# Patient Record
Sex: FEMALE | Race: WHITE | NOT HISPANIC OR LATINO | URBAN - NONMETROPOLITAN AREA
[De-identification: names, ages, dates, MRNs, and addresses within clinical notes are randomized per-mention and may not be internally consistent; named-entity substitution may affect disease eponyms.]

---

## 2017-02-22 ENCOUNTER — TELEPHONE (OUTPATIENT)
Dept: MEDICAL GROUP | Facility: PHYSICIAN GROUP | Age: 42
End: 2017-02-22

## 2017-02-22 NOTE — Clinical Note
February 22, 2017        Jennifer Lamb  [address]  [facility] [address]        Dear Jennifer:    This letter is to make sure that you received results of your mammogram in the fall. This was a normal result. 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    Gratefully this result is normal repeat in one year    If you have any questions or concerns, please don't hesitate to call.        Sincerely,        FRANSISCO Scott.    Electronically Signed

## 2018-10-29 ENCOUNTER — OFFICE VISIT (OUTPATIENT)
Dept: MEDICAL GROUP | Facility: PHYSICIAN GROUP | Age: 43
End: 2018-10-29
Payer: COMMERCIAL

## 2018-10-29 VITALS
BODY MASS INDEX: 33.05 KG/M2 | HEIGHT: 64 IN | DIASTOLIC BLOOD PRESSURE: 78 MMHG | HEART RATE: 70 BPM | TEMPERATURE: 98.8 F | OXYGEN SATURATION: 100 % | RESPIRATION RATE: 16 BRPM | WEIGHT: 193.6 LBS | SYSTOLIC BLOOD PRESSURE: 112 MMHG

## 2018-10-29 DIAGNOSIS — Z00.00 HEALTHCARE MAINTENANCE: ICD-10-CM

## 2018-10-29 DIAGNOSIS — G43.829 MENSTRUAL MIGRAINE WITHOUT STATUS MIGRAINOSUS, NOT INTRACTABLE: ICD-10-CM

## 2018-10-29 PROCEDURE — 99214 OFFICE O/P EST MOD 30 MIN: CPT | Performed by: NURSE PRACTITIONER

## 2018-10-29 RX ORDER — SUMATRIPTAN 100 MG/1
100 TABLET, FILM COATED ORAL
Qty: 10 TAB | Refills: 2 | Status: SHIPPED | OUTPATIENT
Start: 2018-10-29 | End: 2019-08-16 | Stop reason: SDUPTHER

## 2018-10-29 ASSESSMENT — PATIENT HEALTH QUESTIONNAIRE - PHQ9: CLINICAL INTERPRETATION OF PHQ2 SCORE: 0

## 2018-10-29 NOTE — ASSESSMENT & PLAN NOTE
Patient is a 43-year-old female who is here to establish care and discuss migraines.  She started suffering from migraines about 5 years ago when she moved to the state with her  for his work.  Generally she develops a headache which occasionally turns into a migraine during menstruation.  She has worked with her gynecologist to try various birth control methods to mitigate her migraines.  Presently on Tri-Sprintec which seems to be working well for her. She is not getting migraines every month.  Her last migraine was on 10/13/2018 and she was in the ER for blurred vision.  Generally her migraine starts with left eye pain and progresses to a throbbing unilateral pain.  Generally Excedrin Migraine aborts her headache, rarely this does not completely resolve the headache.  While in the ER she had a head CT which was reportedly normal.  Labs were reportedly normal.  She was treated with migraine cocktail and the migraine and blurred vision abruptly resolved.

## 2018-10-29 NOTE — LETTER
[facility]  JOSE LUIS Piña.  [address]  [place] [address]  Fax: [phone]   Authorization for Release/Disclosure of   Protected Health Information   Name: JENNIFER BROOKS : 1975 SSN: [id number]   Address: [address] [place] [id number] Phone:    [phone] (home)    I authorize the entity listed below to release/disclose the PHI below to:   [facility]/HALEY Piña and HALEY Piña   Provider or Entity Name:  [facility] - mammo + ER visit 10/13/18   Address   City, State, Zip   Phone:      Fax:     Reason for request: continuity of care   Information to be released:    [  ] LAST COLONOSCOPY,  including any PATH REPORT and follow-up  [  ] LAST FIT/COLOGUARD RESULT [  ] LAST DEXA  [ x ] LAST MAMMOGRAM  [  ] LAST PAP  [  ] LAST LABS [  ] RETINA EXAM REPORT  [  ] IMMUNIZATION RECORDS  [  ] Release all info      [  ] Check here and initial the line next to each item to release ALL health information INCLUDING  _____ Care and treatment for drug and / or alcohol abuse  _____ HIV testing, infection status, or AIDS  _____ Genetic Testing    DATES OF SERVICE OR TIME PERIOD TO BE DISCLOSED: _____________  I understand and acknowledge that:  * This Authorization may be revoked at any time by you in writing, except if your health information has already been used or disclosed.  * Your health information that will be used or disclosed as a result of you signing this authorization could be re-disclosed by the recipient. If this occurs, your re-disclosed health information may no longer be protected by State or Federal laws.  * You may refuse to sign this Authorization. Your refusal will not affect your ability to obtain treatment.  * This Authorization becomes effective upon signing and will  on (date) __________.      If no date is indicated, this Authorization will  one (1) year from the signature date.    Name: Jennifer Brooks    Signature:   Date:      10/29/2018       PLEASE FAX REQUESTED RECORDS BACK TO: [phone]

## 2018-10-29 NOTE — PROGRESS NOTES
[place] [facility]  Primary Care Office Visit - Problem-Oriented        History:     Jennifer Lamb is a 43 y.o. female who is here today to discuss Migraine (ER [facility]/ double vision/ migraine behind left eye/ referrals needed )      Menstrual migraine without status migrainosus, not intractable  Patient is a 43-year-old female who is here to establish care and discuss migraines.  She started suffering from migraines about 5 years ago when she moved to the [place] with her  for his work.  Generally she develops a headache which occasionally turns into a migraine during menstruation.  She has worked with her gynecologist to try various birth control methods to mitigate her migraines.  Presently on Tri-Sprintec which seems to be working well for her. She is not getting migraines every month.  Her last migraine was on 10/13/2018 and she was in the ER for blurred vision.  Generally her migraine starts with left eye pain and progresses to a throbbing unilateral pain.  Generally Excedrin Migraine aborts her headache, rarely this does not completely resolve the headache.  While in the ER she had a head CT which was reportedly normal.  Labs were reportedly normal.  She was treated with migraine cocktail and the migraine and blurred vision abruptly resolved.      Healthcare maintenance  PAP 2016, normal, GYN - Dr. Yuniel newsome within the year and normal, [facility]         Past Medical History:   Diagnosis Date   • Back pain      Past Surgical History:   Procedure Laterality Date   • ABDOMINAL EXPLORATION      galbladder     Social History     Social History   • Marital status:      Spouse name: N/A   • Number of children: N/A   • Years of education: N/A     Occupational History   • Not on file.     Social History Main Topics   • Smoking status: Never Smoker   • Smokeless tobacco: Never Used   • Alcohol use No   • Drug use: No   • Sexual activity: Yes     Partners: Male     Other Topics Concern   • Not on file  "    Social History Narrative   • No narrative on file     History   Smoking Status   • Never Smoker   Smokeless Tobacco   • Never Used     Family History   Problem Relation Age of Onset   • Diabetes Mother    • Heart Disease Mother    • Arthritis Mother    • Diabetes Father    • Heart Disease Father    • Heart Disease Brother      Allergies   Allergen Reactions   • Codeine      Nausea/ shakes        Problem List:     Patient Active Problem List    Diagnosis Date Noted   • Healthcare maintenance 10/29/2018   • Midline low back pain without sciatica 08/05/2015   • BMI 31.0-31.9,adult 08/05/2015   • Deafness in left ear 08/05/2015   • Menstrual migraine without status migrainosus, not intractable 08/05/2015         Medications:     Current Outpatient Prescriptions:   •  sumatriptan (IMITREX) 100 MG tablet, Take 1 Tab by mouth Once PRN for Migraine for up to 1 dose., Disp: 10 Tab, Rfl: 2  •  Norgestim-Eth Estrad Triphasic (TRI-SPRINTEC) 0.18/0.215/0.25 MG-35 MCG Tab, Take 1 Tab by mouth every day., Disp: , Rfl:   •  hydrocodone-acetaminophen (NORCO) 5-325 MG Tab per tablet, Take 1 Tab by mouth every 6 hours as needed. (Patient not taking: Reported on 10/29/2018), Disp: 30 Tab, Rfl: 0      Review of Systems:     Pertinent positives as per HPI, all other systems reviewed and WNL    Physical Assessment:     VS: /78 (BP Location: Left arm, Patient Position: Sitting, BP Cuff Size: Adult)   Pulse 70   Temp 37.1 °C (98.8 °F) (Temporal)   Resp 16   Ht 1.626 m (5' 4\")   Wt 87.8 kg (193 lb 9.6 oz)   SpO2 100%   BMI 33.23 kg/m²     General: Well-developed, well-nourished, female     Head: PERRL, EOMI. Normocephalic. No facial asymmetry noted.  Neck: Neck supple, no thyromegaly  Cardiovasc:  RRR, no MRG. No thrills or bruits. Pulses 2+ and symmetric at all distal extremities.  Pulmonary: Lungs clear bilaterally.  Normal respiratory effort. No wheeze or crackles.   Extremities: No edema, no TTP bilateral calves. Pedal " pulses intact. No joint effusions. LEs warm and well-perfused.  Neuro: Alert and oriented, CNs II-XII intact, no focal deficits.  Skin:No rashes noted. Skin warm, dry, intact    Psych: Dressed appropriately for the weather, pleasant and conversant.  Affect, mood & judgment appropriate.      Assessment/Plan:   Jennifer was seen today for migraine.    Diagnoses and all orders for this visit:    Menstrual migraine without status migrainosus, not intractable, stable   -We will attempt to obtain head CT report, recommend that she use Excedrin Migraine at the first onset of symptoms should migraine persist, recommend use of sumatriptan  mg.  Aware of the side effects of medication.  Patient should follow-up if migraines increase in frequency and severity which case we will discuss prophylactic treatments.  -     sumatriptan (IMITREX) 100 MG tablet; Take 1 Tab by mouth Once PRN for Migraine for up to 1 dose.    Healthcare maintenance  -Obtain mammo       Patient is agreeable to the above plan and voiced understanding. All questions answered.     Please note that this dictation was created using voice recognition software. I have made every reasonable attempt to correct obvious errors, but I expect that there are errors of grammar and possibly content that I did not discover before finalizing the note.      ZAID Hernandez  10/29/2018, 1:31 PM

## 2019-04-18 ENCOUNTER — OFFICE VISIT (OUTPATIENT)
Dept: MEDICAL GROUP | Facility: PHYSICIAN GROUP | Age: 44
End: 2019-04-18
Payer: COMMERCIAL

## 2019-04-18 VITALS
TEMPERATURE: 98.3 F | OXYGEN SATURATION: 100 % | SYSTOLIC BLOOD PRESSURE: 120 MMHG | HEIGHT: 64 IN | RESPIRATION RATE: 12 BRPM | WEIGHT: 183 LBS | BODY MASS INDEX: 31.24 KG/M2 | DIASTOLIC BLOOD PRESSURE: 70 MMHG | HEART RATE: 68 BPM

## 2019-04-18 DIAGNOSIS — G56.01 CARPAL TUNNEL SYNDROME OF RIGHT WRIST: ICD-10-CM

## 2019-04-18 DIAGNOSIS — M25.511 ACUTE PAIN OF RIGHT SHOULDER: ICD-10-CM

## 2019-04-18 DIAGNOSIS — M79.641 HAND PAIN, RIGHT: ICD-10-CM

## 2019-04-18 PROCEDURE — 99214 OFFICE O/P EST MOD 30 MIN: CPT | Performed by: NURSE PRACTITIONER

## 2019-04-18 RX ORDER — DICLOFENAC SODIUM 75 MG/1
75 TABLET, DELAYED RELEASE ORAL 2 TIMES DAILY
Qty: 60 TAB | Refills: 1 | Status: SHIPPED | OUTPATIENT
Start: 2019-04-18 | End: 2019-08-16

## 2019-04-18 ASSESSMENT — PATIENT HEALTH QUESTIONNAIRE - PHQ9: CLINICAL INTERPRETATION OF PHQ2 SCORE: 0

## 2019-04-18 NOTE — PROGRESS NOTES
[place] [facility]  Primary Care Office Visit - Problem-Oriented        History:     Jennifer Lamb is a 43 y.o. female who is here today to discuss Wrist Pain (R hand, shoulder on and off pain worse the past 2 months, requesting referral)      Carpal tunnel syndrome of right wrist  Patient is a 43-year-old right-handed female, reportedly injured her wrist about 2 months ago bowling.  She continues to have intermittent right hand pain which occasionally radiates to the right lateral epicondyle.  Worse with pushing shopping cart and when sleeping with her arm elevated. She has had intermittent numbness and tingling in the third and fourth fingertips. No weakness in the upper extremity.  She also has right posterior shoulder pain which is worse with extension and internal rotation.  Not taking NSAID. Has called ortho at [facility] and would like a referral.         Past Medical History:   Diagnosis Date   • Back pain      Past Surgical History:   Procedure Laterality Date   • ABDOMINAL EXPLORATION      galbladder     Social History     Social History   • Marital status:      Spouse name: N/A   • Number of children: N/A   • Years of education: N/A     Occupational History   • Not on file.     Social History Main Topics   • Smoking status: Never Smoker   • Smokeless tobacco: Never Used   • Alcohol use No   • Drug use: No   • Sexual activity: Yes     Partners: Male     Other Topics Concern   • Not on file     Social History Narrative   • No narrative on file     History   Smoking Status   • Never Smoker   Smokeless Tobacco   • Never Used     Family History   Problem Relation Age of Onset   • Diabetes Mother    • Heart Disease Mother    • Arthritis Mother    • Diabetes Father    • Heart Disease Father    • Heart Disease Brother      Allergies   Allergen Reactions   • Codeine      Nausea/ shakes        Problem List:     Patient Active Problem List    Diagnosis Date Noted   • Carpal tunnel syndrome of right wrist  "04/18/2019   • Healthcare maintenance 10/29/2018   • Midline low back pain without sciatica 08/05/2015   • BMI 31.0-31.9,adult 08/05/2015   • Deafness in left ear 08/05/2015   • Menstrual migraine without status migrainosus, not intractable 08/05/2015         Medications:     Current Outpatient Prescriptions:   •  diclofenac EC (VOLTAREN) 75 MG Tablet Delayed Response, Take 1 Tab by mouth 2 times a day., Disp: 60 Tab, Rfl: 1  •  Norgestim-Eth Estrad Triphasic (TRI-SPRINTEC) 0.18/0.215/0.25 MG-35 MCG Tab, Take 1 Tab by mouth every day., Disp: , Rfl:   •  sumatriptan (IMITREX) 100 MG tablet, Take 1 Tab by mouth Once PRN for Migraine for up to 1 dose., Disp: 10 Tab, Rfl: 2  •  hydrocodone-acetaminophen (NORCO) 5-325 MG Tab per tablet, Take 1 Tab by mouth every 6 hours as needed. (Patient not taking: Reported on 10/29/2018), Disp: 30 Tab, Rfl: 0      Review of Systems:     Pertinent positives as per HPI, all other systems reviewed and WNL     Physical Assessment:     VS: /70 (BP Location: Left arm, Patient Position: Sitting, BP Cuff Size: Adult)   Pulse 68   Temp 36.8 °C (98.3 °F)   Resp 12   Ht 1.626 m (5' 4\")   Wt 83 kg (183 lb)   SpO2 100%   BMI 31.41 kg/m²     General: Well-developed, well-nourished, female     Head: PERRL, EOMI. Normocephalic. No facial asymmetry noted.  Cardiovasc:RRR, no MRG. No thrills or bruits. Pulses 2+ and symmetric at all distal extremities.  Pulmonary: Lungs clear bilaterally.  Normal respiratory effort. No wheeze or crackles. .  Neuro: Alert and oriented, CNs II-XII intact, no focal deficits.  Skin:No rashes noted. Skin warm, dry, intact    Psych: Dressed appropriately for the weather, pleasant and conversant.  Affect, mood & judgment appropriate.    Right shoulder:  Observation:  No swelling or bruising    No atrophy  Palpation: No tenderness over acromioclvicular or sternoclavicular joints    No tenderness over anterior aspect of shoulder / supraspinatus attachment    No " tenderness over lateral shoulder - deltoid tubercle    No tenderness over posterior shoulder  AROM:  Forward flexion to 170deg    Abduction to 170deg  Special Tests: Jacobson positive, Neers negative      Right elbow:  Observation:  No swelling or bruising    No atrophy  Palpation: No tenderness over medial & lateral epicondyles    No tenderness over olecranon process & olecranon fossa  AROM:  Full flexion & extension  Special Tests: Cubital tunnel tinel - positive    Right wrist/hand:  Observation:  No swelling or bruising    No overt thenar atrophy  Palpation: No tenderness of ulnar styloid    No tenderness of lunate, capitate    No tenderness of scaphoid    No tenderness of TFCC  AROM:  Flexion nml & extension with pain    Special Tests: Carpal tunnel tinel - negative    phalens - negative    Assessment/Plan:   Jennifer was seen today for wrist pain.    Diagnoses and all orders for this visit:      Carpal tunnel syndrome of right wrist  -     REFERRAL TO ORTHOPEDICS  -     diclofenac EC (VOLTAREN) 75 MG Tablet Delayed Response; Take 1 Tab by mouth 2 times a day.    Hand pain, right  -     REFERRAL TO ORTHOPEDICS  -     diclofenac EC (VOLTAREN) 75 MG Tablet Delayed Response; Take 1 Tab by mouth 2 times a day.    Acute pain of right shoulder  -     REFERRAL TO ORTHOPEDICS  -     DX-SHOULDER 2+ RIGHT; Future  -     diclofenac EC (VOLTAREN) 75 MG Tablet Delayed Response; Take 1 Tab by mouth 2 times a day.      Patient is agreeable to the above plan and voiced understanding. All questions answered.     Please note that this dictation was created using voice recognition software. I have made every reasonable attempt to correct obvious errors, but I expect that there are errors of grammar and possibly content that I did not discover before finalizing the note.      ZAID Hernandez  4/18/2019, 3:41 PM

## 2019-04-18 NOTE — PATIENT INSTRUCTIONS
Cubital Tunnel Syndrome  Introduction  Cubital tunnel syndrome is a condition that causes pain and weakness of the forearm and hand. This condition happens when one of the nerves (ulnar nerve) that runs alongside the elbow joint becomes irritated.  What are the causes?  Causes of this condition include:  · Increased pressure on the ulnar nerve at the elbow, arm, or forearm. This can be caused by:  ¨ Swollen tissues.  ¨ Ligaments.  ¨ Muscles.  ¨ Poorly healed elbow fractures.  ¨ Tumors in the elbow. These are usually noncancerous (benign).  ¨ Scar tissue that develops in the elbow after an injury.  ¨ Bony growths (spurs) near the ulnar nerve.  · Stretching of the nerve due to loose elbow ligaments.  · Trauma to the nerve at the elbow.  · Repetitive elbow bending.  · Certain medical conditions.  What increases the risk?  This condition is more likely to develop in:  · People who do manual labor that requires frequently bending the elbow.  · People who play sports that include repeated or strenuous throwing motions, such as baseball.  · People who play contact sports, such as football or lacrosse.  · People who do not warm up properly before activities.  · People who have diabetes.  · People who have an underactive thyroid (hypothyroidism).  What are the signs or symptoms?  Symptoms of this condition include:  · Clumsiness or weakness of the hand.  · Tenderness of the inner elbow.  · Aching or soreness of the inner elbow, forearm, or fingers, especially the little finger or the ring finger.  · Increased pain with forced elbow bending.  · Reduced control when throwing.  · Tingling, numbness, or burning inside the forearm, or in part of the hand or fingers, especially the little finger or the ring finger.  · Sharp pains that shoot from the elbow down to the wrist and hand.  · The inability to  or pinch hard.  How is this diagnosed?  This condition is diagnosed with a medical history and physical exam. Your health  care provider will ask about your symptoms and ask for details about any injury. You may also have other tests, including:  · Electromyogram (EMG). This test checks how well the nerve is working.  · X-ray.  How is this treated?  Treatment starts by stopping the activities that are causing your symptoms to get worse. Treatment may include the use of icing and medicines to reduce pain and swelling. You may also be advised to wear a splint to prevent your elbow from bending or wear an elbow pad where the ulnar nerve is closest to the skin. In less severe cases, treatment may also include working with a physical therapist:  · To help decrease your symptoms.  · To improve the strength and range of motion of your elbow, forearm, and hand.  If the treatments described above do not help, surgery may be needed.  Follow these instructions at home:  If you have a splint:  · Wear it as told by your health care provider. Remove it only as told by your health care provider.  · Loosen the splint if your fingers become numb and tingle, or if they turn cold and blue.  · Keep the splint clean and dry.  Managing pain, stiffness, and swelling  · If directed, apply ice to the injured area:  ¨ Put ice in a plastic bag.  ¨ Place a towel between your skin and the bag.  ¨ Leave the ice on for 20 minutes, 2-3 times per day.  · Move your fingers often to avoid stiffness and to lessen swelling.  · Raise (elevate) the injured area above the level of your heart while you are sitting or lying down.  General instructions  · Take over-the-counter and prescription medicines only as told by your health care provider.  · Keep all follow-up visits as told by your health care provider. This is important.  · Do any exercise or physical therapy as told by your health care provider.  · Do not drive or operate heavy machinery while taking prescription pain medicine.  · If you were given an elbow pad, wear it as told by your health care provider.  Contact a  health care provider if:  · Your symptoms get worse.  · Your symptoms do not get better with treatment.  · Your have new pain.  · Your hand on the injured side feels numb or cold.  This information is not intended to replace advice given to you by your health care provider. Make sure you discuss any questions you have with your health care provider.  Document Released: 12/18/2006 Document Revised: 05/25/2017 Document Reviewed: 02/24/2016  © 2017 [employer]vier        Carpal Tunnel Syndrome  Introduction  Carpal tunnel syndrome is a condition that causes pain in your hand and arm. The carpal tunnel is a narrow area that is on the palm side of your wrist. Repeated wrist motion or certain diseases may cause swelling in the tunnel. This swelling can pinch the main nerve in the wrist (median nerve).  Follow these instructions at home:  If you have a splint:  · Wear it as told by your doctor. Remove it only as told by your doctor.  · Loosen the splint if your fingers:  ¨ Become numb and tingle.  ¨ Turn blue and cold.  · Keep the splint clean and dry.  General instructions  · Take over-the-counter and prescription medicines only as told by your doctor.  · Rest your wrist from any activity that may be causing your pain. If needed, talk to your employer about changes that can be made in your work, such as getting a wrist pad to use while typing.  · If directed, apply ice to the painful area:  ¨ Put ice in a plastic bag.  ¨ Place a towel between your skin and the bag.  ¨ Leave the ice on for 20 minutes, 2-3 times per day.  · Keep all follow-up visits as told by your doctor. This is important.  · Do any exercises as told by your doctor, physical therapist, or occupational therapist.  Contact a doctor if:  · You have new symptoms.  · Medicine does not help your pain.  · Your symptoms get worse.  This information is not intended to replace advice given to you by your health care provider. Make sure you discuss any questions you have  with your health care provider.  Document Released: 12/06/2012 Document Revised: 05/25/2017 Document Reviewed: 05/04/2016  © 2017 Elsevier

## 2019-04-18 NOTE — ASSESSMENT & PLAN NOTE
Patient is a 43-year-old right-handed female, reportedly injured her wrist about 2 months ago bowling.  She continues to have intermittent right hand pain which occasionally radiates to the right lateral epicondyle.  Worse with pushing shopping cart and when sleeping with her arm elevated. She has had intermittent numbness and tingling in the third and fourth fingertips. No weakness in the upper extremity.  She also has right posterior shoulder pain which is worse with extension and internal rotation.  Not taking NSAID. Has called ortho at [facility] and would like a referral.

## 2019-05-07 DIAGNOSIS — M25.511 ACUTE PAIN OF RIGHT SHOULDER: ICD-10-CM

## 2019-08-13 ENCOUNTER — OFFICE VISIT (OUTPATIENT)
Dept: URGENT CARE | Facility: PHYSICIAN GROUP | Age: 44
End: 2019-08-13
Payer: COMMERCIAL

## 2019-08-13 ENCOUNTER — HOSPITAL ENCOUNTER (OUTPATIENT)
Facility: MEDICAL CENTER | Age: 44
End: 2019-08-13
Attending: PHYSICIAN ASSISTANT
Payer: COMMERCIAL

## 2019-08-13 VITALS
OXYGEN SATURATION: 98 % | HEIGHT: 64 IN | TEMPERATURE: 98.4 F | SYSTOLIC BLOOD PRESSURE: 128 MMHG | DIASTOLIC BLOOD PRESSURE: 82 MMHG | BODY MASS INDEX: 32.27 KG/M2 | WEIGHT: 189 LBS | RESPIRATION RATE: 14 BRPM | HEART RATE: 64 BPM

## 2019-08-13 DIAGNOSIS — R30.0 DYSURIA: ICD-10-CM

## 2019-08-13 DIAGNOSIS — N30.01 ACUTE CYSTITIS WITH HEMATURIA: Primary | ICD-10-CM

## 2019-08-13 DIAGNOSIS — E66.9 OBESITY (BMI 30-39.9): ICD-10-CM

## 2019-08-13 LAB
APPEARANCE UR: NORMAL
BILIRUB UR STRIP-MCNC: NORMAL MG/DL
COLOR UR AUTO: NORMAL
GLUCOSE UR STRIP.AUTO-MCNC: NORMAL MG/DL
KETONES UR STRIP.AUTO-MCNC: NORMAL MG/DL
LEUKOCYTE ESTERASE UR QL STRIP.AUTO: NORMAL
NITRITE UR QL STRIP.AUTO: NORMAL
PH UR STRIP.AUTO: 6.5 [PH] (ref 5–8)
PROT UR QL STRIP: NORMAL MG/DL
RBC UR QL AUTO: NORMAL
SP GR UR STRIP.AUTO: 1.01
UROBILINOGEN UR STRIP-MCNC: NORMAL MG/DL

## 2019-08-13 PROCEDURE — 87186 SC STD MICRODIL/AGAR DIL: CPT

## 2019-08-13 PROCEDURE — 99214 OFFICE O/P EST MOD 30 MIN: CPT | Performed by: PHYSICIAN ASSISTANT

## 2019-08-13 PROCEDURE — 87077 CULTURE AEROBIC IDENTIFY: CPT

## 2019-08-13 PROCEDURE — 87086 URINE CULTURE/COLONY COUNT: CPT

## 2019-08-13 PROCEDURE — 81002 URINALYSIS NONAUTO W/O SCOPE: CPT | Performed by: PHYSICIAN ASSISTANT

## 2019-08-13 RX ORDER — NITROFURANTOIN 25; 75 MG/1; MG/1
100 CAPSULE ORAL EVERY 12 HOURS
Qty: 10 CAP | Refills: 0 | Status: SHIPPED | OUTPATIENT
Start: 2019-08-13 | End: 2019-08-16

## 2019-08-13 RX ORDER — PHENAZOPYRIDINE HYDROCHLORIDE 200 MG/1
200 TABLET, FILM COATED ORAL 3 TIMES DAILY
Qty: 6 TAB | Refills: 0 | Status: SHIPPED | OUTPATIENT
Start: 2019-08-13 | End: 2019-08-15

## 2019-08-14 DIAGNOSIS — N30.01 ACUTE CYSTITIS WITH HEMATURIA: ICD-10-CM

## 2019-08-14 NOTE — PROGRESS NOTES
Chief Complaint   Patient presents with   • UTI       HISTORY OF PRESENT ILLNESS: Patient is a 44 y.o. female who presents today because she has a 4-day history of urinary discomfort, some mild lower abdominal discomfort and she has urinary urgency and frequency.  She has not been taking any medications for her symptoms, denies any fevers, chills, nausea, vomiting or diarrhea.    Patient Active Problem List    Diagnosis Date Noted   • Obesity (BMI 30-39.9) 08/13/2019   • Carpal tunnel syndrome of right wrist 04/18/2019   • Healthcare maintenance 10/29/2018   • Midline low back pain without sciatica 08/05/2015   • BMI 31.0-31.9,adult 08/05/2015   • Deafness in left ear 08/05/2015   • Menstrual migraine without status migrainosus, not intractable 08/05/2015       Allergies:Codeine    Current Outpatient Medications Ordered in Epic   Medication Sig Dispense Refill   • nitrofurantoin monohyd macro (MACROBID) 100 MG Cap Take 1 Cap by mouth every 12 hours for 5 days. 10 Cap 0   • phenazopyridine (PYRIDIUM) 200 MG Tab Take 1 Tab by mouth 3 times a day for 2 days. 6 Tab 0   • diclofenac EC (VOLTAREN) 75 MG Tablet Delayed Response Take 1 Tab by mouth 2 times a day. 60 Tab 1   • sumatriptan (IMITREX) 100 MG tablet Take 1 Tab by mouth Once PRN for Migraine for up to 1 dose. 10 Tab 2   • Norgestim-Eth Estrad Triphasic (TRI-SPRINTEC) 0.18/0.215/0.25 MG-35 MCG Tab Take 1 Tab by mouth every day.     • hydrocodone-acetaminophen (NORCO) 5-325 MG Tab per tablet Take 1 Tab by mouth every 6 hours as needed. (Patient not taking: Reported on 10/29/2018) 30 Tab 0     No current Epic-ordered facility-administered medications on file.        Past Medical History:   Diagnosis Date   • Back pain        Social History     Tobacco Use   • Smoking status: Never Smoker   • Smokeless tobacco: Never Used   Substance Use Topics   • Alcohol use: No   • Drug use: No       Family Status   Relation Name Status   • Mo  Alive   • Fa  Alive   • Bro  Alive  "    Family History   Problem Relation Age of Onset   • Diabetes Mother    • Heart Disease Mother    • Arthritis Mother    • Diabetes Father    • Heart Disease Father    • Heart Disease Brother        ROS:  Review of Systems   Constitutional: Negative for fever, chills, weight loss and malaise/fatigue.   HENT: Negative for ear pain, nosebleeds, congestion, sore throat and neck pain.    Eyes: Negative for blurred vision.   Respiratory: Negative for cough, sputum production, shortness of breath and wheezing.    Cardiovascular: Negative for chest pain, palpitations, orthopnea and leg swelling.   Gastrointestinal: Negative for heartburn, nausea, vomiting and abdominal pain.   Genitourinary: Positive for dysuria, urgency and frequency.     Exam:  /82 (BP Location: Right arm, Patient Position: Sitting, BP Cuff Size: Adult)   Pulse 64   Temp 36.9 °C (98.4 °F) (Temporal)   Resp 14   Ht 1.626 m (5' 4\")   Wt 85.7 kg (189 lb)   SpO2 98%   General:  Well nourished, well developed female in NAD  Head:Normocephalic, atraumatic  Eyes: PERRLA, EOM within normal limits, no conjunctival injection, no scleral icterus, visual fields and acuity grossly intact.  Nose: Symmetrical without tenderness, no discharge.  Mouth: reasonable hygiene, no erythema exudates or tonsillar enlargement.  Neck: no masses, range of motion within normal limits, no tracheal deviation. No obvious thyroid enlargement.  Extremities: no clubbing, cyanosis, or edema.     Urinalysis shows blood and leuks.    Please note that this dictation was created using voice recognition software. I have made every reasonable attempt to correct obvious errors, but I expect that there are errors of grammar and possibly content that I did not discover before finalizing the note.    Assessment/Plan:  1. Acute cystitis with hematuria  Urine Culture    nitrofurantoin monohyd macro (MACROBID) 100 MG Cap   2. Dysuria  POCT Urinalysis    phenazopyridine (PYRIDIUM) 200 MG " Tab   3. Obesity (BMI 30-39.9)  Patient identified as having weight management issue.  Appropriate orders and counseling given.   Increase p.o. fluids    Followup with primary care in the next 7-10 days if not significantly improving, return to the urgent care or go to the emergency room sooner for any worsening of symptoms.

## 2019-08-16 ENCOUNTER — OFFICE VISIT (OUTPATIENT)
Dept: MEDICAL GROUP | Facility: PHYSICIAN GROUP | Age: 44
End: 2019-08-16
Payer: COMMERCIAL

## 2019-08-16 VITALS
OXYGEN SATURATION: 96 % | DIASTOLIC BLOOD PRESSURE: 70 MMHG | RESPIRATION RATE: 12 BRPM | WEIGHT: 189 LBS | SYSTOLIC BLOOD PRESSURE: 132 MMHG | HEART RATE: 66 BPM | TEMPERATURE: 99.8 F | BODY MASS INDEX: 32.27 KG/M2 | HEIGHT: 64 IN

## 2019-08-16 DIAGNOSIS — Z13.6 SCREENING FOR CARDIOVASCULAR CONDITION: ICD-10-CM

## 2019-08-16 DIAGNOSIS — G56.01 CARPAL TUNNEL SYNDROME OF RIGHT WRIST: ICD-10-CM

## 2019-08-16 DIAGNOSIS — Z30.41 ENCOUNTER FOR BIRTH CONTROL PILLS MAINTENANCE: ICD-10-CM

## 2019-08-16 DIAGNOSIS — G43.829 MENSTRUAL MIGRAINE WITHOUT STATUS MIGRAINOSUS, NOT INTRACTABLE: ICD-10-CM

## 2019-08-16 DIAGNOSIS — G89.29 CHRONIC MIDLINE LOW BACK PAIN WITHOUT SCIATICA: ICD-10-CM

## 2019-08-16 DIAGNOSIS — M54.50 CHRONIC MIDLINE LOW BACK PAIN WITHOUT SCIATICA: ICD-10-CM

## 2019-08-16 DIAGNOSIS — Z13.21 ENCOUNTER FOR VITAMIN DEFICIENCY SCREENING: ICD-10-CM

## 2019-08-16 DIAGNOSIS — M25.511 RIGHT SHOULDER PAIN, UNSPECIFIED CHRONICITY: ICD-10-CM

## 2019-08-16 DIAGNOSIS — Z13.29 SCREENING FOR THYROID DISORDER: ICD-10-CM

## 2019-08-16 LAB
BACTERIA UR CULT: ABNORMAL
BACTERIA UR CULT: ABNORMAL
SIGNIFICANT IND 70042: ABNORMAL
SITE SITE: ABNORMAL
SOURCE SOURCE: ABNORMAL

## 2019-08-16 PROCEDURE — 99213 OFFICE O/P EST LOW 20 MIN: CPT | Performed by: NURSE PRACTITIONER

## 2019-08-16 RX ORDER — SUMATRIPTAN 100 MG/1
100 TABLET, FILM COATED ORAL
Qty: 10 TAB | Refills: 2 | Status: SHIPPED | OUTPATIENT
Start: 2019-08-16 | End: 2021-01-14 | Stop reason: SDUPTHER

## 2019-08-16 RX ORDER — NORETHINDRONE ACETATE AND ETHINYL ESTRADIOL 1MG-20(21)
1 KIT ORAL DAILY
Qty: 28 TAB | Refills: 11 | Status: SHIPPED | OUTPATIENT
Start: 2019-08-16 | End: 2020-07-10 | Stop reason: SDUPTHER

## 2019-08-16 RX ORDER — NORETHINDRONE ACETATE AND ETHINYL ESTRADIOL 1MG-20(21)
1 KIT ORAL DAILY
COMMUNITY
End: 2019-08-16 | Stop reason: SDUPTHER

## 2019-08-16 NOTE — ASSESSMENT & PLAN NOTE
Patient presenting today for refill of birth control pills.  She is taking Junel FE 1/20.  Previously prescribed by her gynecologist, Dr. Brice at [facility].  Patient reports when she called to get a refill, they told her that she could also get refills from her primary care provider.  Has been tolerating medication with migraine prevention also.  Patient reports last Pap was 2 to 3 years ago and was normal.  Will request records.  Patient reports did have a Mirena IUD for approximately 6 months over a year ago.  Did not like the way it made her feel, so moved.  She reports since Mirena trial, her periods have been irregular.  Menses are usually 1-2 times a month, lasting 4 to 7 days, light to moderate flow.  Patient denies aura with migraines, she does not smoke, denies history of DVT, blood pressure is well controlled.  Will refill OCP.

## 2019-08-16 NOTE — ASSESSMENT & PLAN NOTE
Patient reports right shoulder pain is improved, though not all the way better.  Consulted with orthopedics and had MRI.  Reportedly was told had a pinched nerve was given a injection and completed physical therapy.  She reports that she was told to come back as needed.  I have requested records today.

## 2019-08-16 NOTE — LETTER
[facility]  [place] V[place]  [address] [place] [address]  Fax: [phone]   Authorization for Release/Disclosure of   Protected Health Information   Name: STORM BROOKS : 1975 SSN: [id number]   Address: [address] [place] [address] Phone:    [phone] (home)    I authorize the entity listed below to release/disclose the PHI below to:   [facility]/HALEY Vences and HALEY Vences   Provider or Entity Name:        [facility], [facility], [facility]   Phone:             [phone]    Fax:             [phone]   Reason for request: continuity of care   Information to be released:    [  ] LAST COLONOSCOPY,  including any PATH REPORT and follow-up  [  ] LAST FIT/COLOGUARD RESULT [  ] LAST DEXA  [  ] LAST MAMMOGRAM  [X] LAST PAP  [  ] LAST LABS [  ] RETINA EXAM REPORT  [  ] IMMUNIZATION RECORDS  [  ] Release all info      [  ] Check here and initial the line next to each item to release ALL health information INCLUDING  _____ Care and treatment for drug and / or alcohol abuse  _____ HIV testing, infection status, or AIDS  _____ Genetic Testing    DATES OF SERVICE OR TIME PERIOD TO BE DISCLOSED: _____________  I understand and acknowledge that:  * This Authorization may be revoked at any time by you in writing, except if your health information has already been used or disclosed.  * Your health information that will be used or disclosed as a result of you signing this authorization could be re-disclosed by the recipient. If this occurs, your re-disclosed health information may no longer be protected by State or Federal laws.  * You may refuse to sign this Authorization. Your refusal will not affect your ability to obtain treatment.  * This Authorization becomes effective upon signing and will  on (date) __________.      If no date is indicated, this Authorization will  one (1) year  from the signature date.    Name: Jennifer Lamb    Signature:   Date:     8/16/2019       PLEASE FAX REQUESTED RECORDS BACK TO: [phone]

## 2019-08-16 NOTE — ASSESSMENT & PLAN NOTE
This is a chronic health problem that is well controlled with current medications and lifestyle measures.  Reports migraines occurring once every 1 to 2 months.  Relieved with sumatriptan.  Migraines usually associated with menses.  Has worked with her gynecologist to try various birth control methods to prevent her migraines.  She has been taking Junel FE 1/20, which has significantly reduce the amount of migraines she was getting.  Patient is requesting refill today.

## 2019-08-16 NOTE — LETTER
[facility]  [place]  [address] [place] [address]  Fax: [phone]   Authorization for Release/Disclosure of   Protected Health Information   Name: STORM BROOKS : 1975 SSN: [id number]   Address: [address] [place] [address] Phone:    [phone] (home)    I authorize the entity listed below to release/disclose the PHI below to:   [facility]/HALEY Vences and HALEY Vences   Provider or Entity Name:           [facility], [facility]   Phone:               [phone]    Fax:               [phone]   Reason for request: continuity of care   Information to be released:    [  ] LAST COLONOSCOPY,  including any PATH REPORT and follow-up  [  ] LAST FIT/COLOGUARD RESULT [  ] LAST DEXA  [  ] LAST MAMMOGRAM  [  ] LAST PAP  [  ] LAST LABS [  ] RETINA EXAM REPORT  [  ] IMMUNIZATION RECORDS  [X] Release resent visit notes and imaging      [  ] Check here and initial the line next to each item to release ALL health information INCLUDING  _____ Care and treatment for drug and / or alcohol abuse  _____ HIV testing, infection status, or AIDS  _____ Genetic Testing    DATES OF SERVICE OR TIME PERIOD TO BE DISCLOSED: _____________  I understand and acknowledge that:  * This Authorization may be revoked at any time by you in writing, except if your health information has already been used or disclosed.  * Your health information that will be used or disclosed as a result of you signing this authorization could be re-disclosed by the recipient. If this occurs, your re-disclosed health information may no longer be protected by State or Federal laws.  * You may refuse to sign this Authorization. Your refusal will not affect your ability to obtain treatment.  * This Authorization becomes effective upon signing and will  on (date) __________.      If no date is indicated, this Authorization  will  one (1) year from the signature date.    Name: Jennifer Lamb    Signature:   Date:     2019       PLEASE FAX REQUESTED RECORDS BACK TO: [phone]

## 2019-08-16 NOTE — ASSESSMENT & PLAN NOTE
Chronic health problem, improving.  Consulted with orthopedics and had nerve conduction study.  Reportedly was borderline carpal tunnel versus tendinitis.  Patient reports wearing wrist splint while bowling and has helped.  Records from [facility] have been requested today.

## 2019-08-16 NOTE — ASSESSMENT & PLAN NOTE
Patient reports history of chronic back pain, waxes and wanes.  Sometimes gets left leg pain.  Will managed with stretching, ibuprofen as needed.  Reports was referred to physical therapy at one time, but was never able to schedule due to referral mixup.  She is not interested in pursuing at this time.

## 2019-08-16 NOTE — PROGRESS NOTES
CC: Establish care, medication refills    HISTORY OF THE PRESENT ILLNESS: Patient is a 44 y.o. female. This pleasant patient is here today to establish care for evaluation management following health problems.  Patient's previous primary care provider is Alka WEEKS.    Health Maintenance: Reports last Pap was done by gynecologist approximately 2 years ago.  Will get records.      Menstrual migraine without status migrainosus, not intractable  This is a chronic health problem that is well controlled with current medications and lifestyle measures.  Reports migraines occurring once every 1 to 2 months.  Relieved with sumatriptan.  Migraines usually associated with menses.  Has worked with her gynecologist to try various birth control methods to prevent her migraines.  She has been taking Junel FE 1/20, which has significantly reduce the amount of migraines she was getting.  Patient is requesting refill today.      Midline low back pain without sciatica  Patient reports history of chronic back pain, waxes and wanes.  Sometimes gets left leg pain.  Will managed with stretching, ibuprofen as needed.  Reports was referred to physical therapy at one time, but was never able to schedule due to referral mixup.  She is not interested in pursuing at this time.        Encounter for birth control pills maintenance  Patient presenting today for refill of birth control pills.  She is taking Junel FE 1/20.  Previously prescribed by her gynecologist, Dr. Brice at [facility].  Patient reports when she called to get a refill, they told her that she could also get refills from her primary care provider.  Has been tolerating medication with migraine prevention also.  Patient reports last Pap was 2 to 3 years ago and was normal.  Will request records.  Patient reports did have a Mirena IUD for approximately 6 months over a year ago.  Did not like the way it made her feel, so moved.  She reports since Mirena trial, her periods have  been irregular.  Menses are usually 1-2 times a month, lasting 4 to 7 days, light to moderate flow.  Patient denies aura with migraines, she does not smoke, denies history of DVT, blood pressure is well controlled.  Will refill OCP.      Carpal tunnel syndrome of right wrist  Chronic health problem, improving.  Consulted with orthopedics and had nerve conduction study.  Reportedly was borderline carpal tunnel versus tendinitis.  Patient reports wearing wrist splint while bowling and has helped.  Records from [facility] have been requested today.    Right shoulder pain  Patient reports right shoulder pain is improved, though not all the way better.  Consulted with orthopedics and had MRI.  Reportedly was told had a pinched nerve was given a injection and completed physical therapy.  She reports that she was told to come back as needed.  I have requested records today.      Allergies: Codeine    Current Outpatient Medications Ordered in Epic   Medication Sig Dispense Refill   • sumatriptan (IMITREX) 100 MG tablet Take 1 Tab by mouth Once PRN for Migraine for up to 1 dose. 10 Tab 2   • norethindrone-ethinyl estradiol (JUNEL FE 1/20) 1-20 MG-MCG per tablet Take 1 Tab by mouth every day. 28 Tab 11     No current [facility]-ordered facility-administered medications on file.        Past Medical History:   Diagnosis Date   • Back pain        Past Surgical History:   Procedure Laterality Date   • ABDOMINAL EXPLORATION      galbladder       Social History     Tobacco Use   • Smoking status: Never Smoker   • Smokeless tobacco: Never Used   Substance Use Topics   • Alcohol use: No   • Drug use: No       Family History   Problem Relation Age of Onset   • Diabetes Mother    • Heart Disease Mother    • Arthritis Mother    • Diabetes Father    • Heart Disease Father    • Heart Disease Brother        ROS:   As in HPI, otherwise negative for chest pain, dyspnea, abdominal pain, dysuria, blood in stool, fever           Exam: /70  "(BP Location: Right arm, Patient Position: Sitting, BP Cuff Size: Adult)   Pulse 66   Temp 37.7 °C (99.8 °F)   Resp 12   Ht 1.626 m (5' 4\")   Wt 85.7 kg (189 lb)   SpO2 96%  Body mass index is 32.44 kg/m².    General: Alert, pleasant, well nourished, well developed female in NAD  HEENT: Normocephalic. Eyes conjunctiva clear lids without ptosis, pupils equal and reactive to light, ears normal shape and contour, canals are clear bilaterally, tympanic membranes are pearly gray with good light reflex, nasal mucosa without erythema and drainage, oropharynx is without erythema, edema or exudates.   Neck: Supple without bruit. Thyroid is not enlarged.  Pulmonary: Clear to ausculation.  Normal effort. No rales, ronchi, or wheezing.  Cardiovascular: Normal rate and rhythm without murmur. Carotid and radial pulses are intact and equal bilaterally.  No lower extremity edema.  Abdomen: Soft, nontender, nondistended. Normal bowel sounds. Liver and spleen are not palpable  Neurologic: Grossly nonfocal  Lymph: No cervical or supraclavicular lymph nodes are palpable  Skin: Warm and dry.    Musculoskeletal: Normal gait.   Psych: Normal mood and affect. Alert and oriented. Judgment and insight is normal.    Please note that this dictation was created using voice recognition software. I have made every reasonable attempt to correct obvious errors, but I expect that there are errors of grammar and possibly content that I did not discover before finalizing the note.      Assessment/Plan  1. Menstrual migraine without status migrainosus, not intractable  Well controlled, rarely having migraines.  Good relief with sumatriptan.  Refill sent to pharmacy.  - sumatriptan (IMITREX) 100 MG tablet; Take 1 Tab by mouth Once PRN for Migraine for up to 1 dose.  Dispense: 10 Tab; Refill: 2    2. Chronic midline low back pain without sciatica  Continue with lifestyle measures.  Continue to monitor.    3. Screening for cardiovascular " condition  Will notify patient of lab results via [facility].  - Comp Metabolic Panel; Future  - Lipid Profile; Future  - ESTIMATED GFR; Future    4. Screening for thyroid disorder  Will notify patient of lab results via [facility].  - TSH; Future  - FREE THYROXINE; Future    5. Encounter for vitamin deficiency screening    - VITAMIN D,25 HYDROXY; Future    6. Encounter for birth control pills maintenance  No contraindications.  Refill for OCP sent to pharmacy today.    7. Carpal tunnel syndrome of right wrist  Continue with lifestyle measures, splint while bowling.  Continue to monitor.  Records requested today from [facility].    8. Right shoulder pain, unspecified chronicity  Improved after physical therapy and steroid injection.  Continue to monitor and follow-up with orthopedics as needed.  Records requested today from [facility].    Patient will return to clinic annually or sooner if needed and pending lab results.

## 2019-09-02 ENCOUNTER — OFFICE VISIT (OUTPATIENT)
Dept: URGENT CARE | Facility: PHYSICIAN GROUP | Age: 44
End: 2019-09-02
Payer: COMMERCIAL

## 2019-09-02 VITALS
BODY MASS INDEX: 32.27 KG/M2 | DIASTOLIC BLOOD PRESSURE: 82 MMHG | RESPIRATION RATE: 14 BRPM | HEART RATE: 80 BPM | WEIGHT: 189 LBS | OXYGEN SATURATION: 97 % | HEIGHT: 64 IN | TEMPERATURE: 97.6 F | SYSTOLIC BLOOD PRESSURE: 124 MMHG

## 2019-09-02 DIAGNOSIS — L03.818 CELLULITIS OF OTHER SPECIFIED SITE: ICD-10-CM

## 2019-09-02 PROCEDURE — 99214 OFFICE O/P EST MOD 30 MIN: CPT | Performed by: PHYSICIAN ASSISTANT

## 2019-09-02 RX ORDER — AMOXICILLIN AND CLAVULANATE POTASSIUM 875; 125 MG/1; MG/1
1 TABLET, FILM COATED ORAL 2 TIMES DAILY
Qty: 20 TAB | Refills: 0 | Status: SHIPPED | OUTPATIENT
Start: 2019-09-02 | End: 2019-09-12

## 2019-09-02 NOTE — PROGRESS NOTES
Chief Complaint   Patient presents with   • Bug Bite       HISTORY OF PRESENT ILLNESS: Patient is a 44 y.o. female who presents today because She had a bug bite on her left breast about a week ago.  It has been red ever since.  However over the last 2 days she has noticed a red line extending from the area of the bite down towards her left nipple.  She has not been taking any medications for her symptoms, feels fine otherwise.  Reports primarily itching, minimal pain    Patient Active Problem List    Diagnosis Date Noted   • Encounter for birth control pills maintenance 08/16/2019   • Right shoulder pain 08/16/2019   • Obesity (BMI 30-39.9) 08/13/2019   • Carpal tunnel syndrome of right wrist 04/18/2019   • Healthcare maintenance 10/29/2018   • Midline low back pain without sciatica 08/05/2015   • BMI 31.0-31.9,adult 08/05/2015   • Deafness in left ear 08/05/2015   • Menstrual migraine without status migrainosus, not intractable 08/05/2015       Allergies:Codeine    Current Outpatient Medications Ordered in Epic   Medication Sig Dispense Refill   • amoxicillin-clavulanate (AUGMENTIN) 875-125 MG Tab Take 1 Tab by mouth 2 times a day for 10 days. 20 Tab 0   • sumatriptan (IMITREX) 100 MG tablet Take 1 Tab by mouth Once PRN for Migraine for up to 1 dose. 10 Tab 2   • norethindrone-ethinyl estradiol (JUNEL FE 1/20) 1-20 MG-MCG per tablet Take 1 Tab by mouth every day. 28 Tab 11     No current [facility]-ordered facility-administered medications on file.        Past Medical History:   Diagnosis Date   • Back pain        Social History     Tobacco Use   • Smoking status: Never Smoker   • Smokeless tobacco: Never Used   Substance Use Topics   • Alcohol use: No   • Drug use: No       Family Status   Relation Name Status   • Mo  Alive   • Fa  Alive   • Bro  Alive     Family History   Problem Relation Age of Onset   • Diabetes Mother    • Heart Disease Mother    • Arthritis Mother    • Diabetes Father    • Heart Disease Father    •  "Heart Disease Brother        ROS:  Review of Systems   Constitutional: Negative for fever, chills, weight loss and malaise/fatigue.   HENT: Negative for ear pain, nosebleeds, congestion, sore throat and neck pain.    Eyes: Negative for blurred vision.   Respiratory: Negative for cough, sputum production, shortness of breath and wheezing.    Cardiovascular: Negative for chest pain, palpitations, orthopnea and leg swelling.   Gastrointestinal: Negative for heartburn, nausea, vomiting and abdominal pain.   Genitourinary: Negative for dysuria, urgency and frequency.     Exam:  /82 (BP Location: Right arm, Patient Position: Sitting, BP Cuff Size: Adult)   Pulse 80   Temp 36.4 °C (97.6 °F) (Temporal)   Resp 14   Ht 1.626 m (5' 4\")   Wt 85.7 kg (189 lb)   SpO2 97%   General:  Well nourished, well developed female in NAD  Head:Normocephalic, atraumatic  Eyes: PERRLA, EOM within normal limits, no conjunctival injection, no scleral icterus, visual fields and acuity grossly intact.  Nose: Symmetrical without tenderness, no discharge.  Mouth: reasonable hygiene, no erythema exudates or tonsillar enlargement.  Neck: no masses, range of motion within normal limits, no tracheal deviation. No obvious thyroid enlargement.  Extremities: no clubbing, cyanosis, or edema.  Skin: On the upper midline of the left breast there is a 3 cm diameter area of mildly tender erythematous skin without any, there is a red line extending in his neck-like pattern approximately 10 to 12 cm towards the areola area      Please note that this dictation was created using voice recognition software. I have made every reasonable attempt to correct obvious errors, but I expect that there are errors of grammar and possibly content that I did not discover before finalizing the note.    Assessment/Plan:  1. Cellulitis of other specified site  amoxicillin-clavulanate (AUGMENTIN) 875-125 MG Tab       Followup with primary care in the next 7-10 days if " not significantly improving, return to the urgent care or go to the emergency room sooner for any worsening of symptoms.

## 2019-09-13 ENCOUNTER — HOSPITAL ENCOUNTER (OUTPATIENT)
Dept: RADIOLOGY | Facility: MEDICAL CENTER | Age: 44
End: 2019-09-13

## 2019-10-07 ENCOUNTER — TELEPHONE (OUTPATIENT)
Dept: MEDICAL GROUP | Facility: PHYSICIAN GROUP | Age: 44
End: 2019-10-07

## 2019-10-07 DIAGNOSIS — E55.9 VITAMIN D DEFICIENCY: ICD-10-CM

## 2019-10-07 RX ORDER — ERGOCALCIFEROL 1.25 MG/1
50000 CAPSULE ORAL
Qty: 12 CAP | Refills: 0 | Status: SHIPPED | OUTPATIENT
Start: 2019-10-07 | End: 2020-01-06

## 2019-10-07 NOTE — TELEPHONE ENCOUNTER
Lab results reviewed.  In media.  Normal cmp, tsh, free t4, lipid profile.  Vitamin d deficiency.  Will prescribe high dose weekly vitamin D. Patient notified via My Chart.

## 2020-10-04 DIAGNOSIS — E55.9 VITAMIN D DEFICIENCY: ICD-10-CM

## 2020-10-05 ENCOUNTER — TELEPHONE (OUTPATIENT)
Dept: MEDICAL GROUP | Facility: PHYSICIAN GROUP | Age: 45
End: 2020-10-05

## 2020-10-05 NOTE — TELEPHONE ENCOUNTER
Patient is to be taking daily supplemental vitamin D3 4000 units a day.  She can purchase this over-the-counter.

## 2020-10-05 NOTE — TELEPHONE ENCOUNTER
Received request via: Pharmacy    Was the patient seen in the last year in this department? No     Does the patient have an active prescription (recently filled or refills available) for medication(s) requested? No      Medication not in med list, requested from pharmacy   vitamin D, Ergocalciferol, (DRISDOL) 55487 units Cap capsule 12 Cap 0/0 10/7/2019 1/6/2020    Sig - Route: Take 1 Cap by mouth every 7 days for 91 days. - Oral

## 2020-10-06 RX ORDER — ERGOCALCIFEROL 1.25 MG/1
CAPSULE ORAL
Qty: 12 CAP | Refills: 0 | OUTPATIENT
Start: 2020-10-06

## 2020-10-19 ENCOUNTER — OFFICE VISIT (OUTPATIENT)
Dept: URGENT CARE | Facility: PHYSICIAN GROUP | Age: 45
End: 2020-10-19
Payer: COMMERCIAL

## 2020-10-19 ENCOUNTER — HOSPITAL ENCOUNTER (OUTPATIENT)
Facility: MEDICAL CENTER | Age: 45
End: 2020-10-19
Attending: PHYSICIAN ASSISTANT
Payer: COMMERCIAL

## 2020-10-19 VITALS
BODY MASS INDEX: 32.1 KG/M2 | OXYGEN SATURATION: 99 % | WEIGHT: 187 LBS | RESPIRATION RATE: 16 BRPM | SYSTOLIC BLOOD PRESSURE: 132 MMHG | HEART RATE: 70 BPM | DIASTOLIC BLOOD PRESSURE: 80 MMHG | TEMPERATURE: 98.3 F

## 2020-10-19 DIAGNOSIS — J06.9 UPPER RESPIRATORY TRACT INFECTION, UNSPECIFIED TYPE: ICD-10-CM

## 2020-10-19 DIAGNOSIS — Z20.822 EXPOSURE TO COVID-19 VIRUS: ICD-10-CM

## 2020-10-19 LAB
COVID ORDER STATUS COVID19: NORMAL
SARS-COV-2 RNA RESP QL NAA+PROBE: DETECTED
SPECIMEN SOURCE: ABNORMAL

## 2020-10-19 PROCEDURE — 99214 OFFICE O/P EST MOD 30 MIN: CPT | Mod: CS | Performed by: PHYSICIAN ASSISTANT

## 2020-10-19 PROCEDURE — U0003 INFECTIOUS AGENT DETECTION BY NUCLEIC ACID (DNA OR RNA); SEVERE ACUTE RESPIRATORY SYNDROME CORONAVIRUS 2 (SARS-COV-2) (CORONAVIRUS DISEASE [COVID-19]), AMPLIFIED PROBE TECHNIQUE, MAKING USE OF HIGH THROUGHPUT TECHNOLOGIES AS DESCRIBED BY CMS-2020-01-R: HCPCS

## 2020-10-19 NOTE — PROGRESS NOTES
Chief Complaint   Patient presents with   • Congestion     loss of smell and taste       HISTORY OF PRESENT ILLNESS: Patient is a 45 y.o. female who presents today because she has had nasal congestion and a mild cough for about a week.  She attributed this to her regular allergy symptoms and has been taking allergy medication with only minimal improvement.  However her son had similar symptoms and was tested for Covid 3 days ago and got the results back today that were positive.    Patient Active Problem List    Diagnosis Date Noted   • Vitamin D deficiency 10/07/2019   • Encounter for birth control pills maintenance 08/16/2019   • Right shoulder pain 08/16/2019   • Obesity (BMI 30-39.9) 08/13/2019   • Carpal tunnel syndrome of right wrist 04/18/2019   • Healthcare maintenance 10/29/2018   • Midline low back pain without sciatica 08/05/2015   • BMI 31.0-31.9,adult 08/05/2015   • Deafness in left ear 08/05/2015   • Menstrual migraine without status migrainosus, not intractable 08/05/2015       Allergies:Codeine    Current Outpatient Medications Ordered in Epic   Medication Sig Dispense Refill   • norethindrone-ethinyl estradiol (JUNEL FE 1/20) 1-20 MG-MCG per tablet Take 1 Tab by mouth every day. 86 Tab 0   • sumatriptan (IMITREX) 100 MG tablet Take 1 Tab by mouth Once PRN for Migraine for up to 1 dose. (Patient not taking: Reported on 10/19/2020) 10 Tab 2     No current Epic-ordered facility-administered medications on file.        Past Medical History:   Diagnosis Date   • Back pain        Social History     Tobacco Use   • Smoking status: Never Smoker   • Smokeless tobacco: Never Used   Substance Use Topics   • Alcohol use: No   • Drug use: No       Family Status   Relation Name Status   • Mo  Alive   • Fa  Alive   • Bro  Alive     Family History   Problem Relation Age of Onset   • Diabetes Mother    • Heart Disease Mother    • Arthritis Mother    • Diabetes Father    • Heart Disease Father    • Heart Disease  Brother        ROS:  Review of Systems   Constitutional: Negative for fever, chills, weight loss and malaise/fatigue.   HENT: Negative for ear pain, nosebleeds, positive for congestion, sore throat and no neck pain.    Eyes: Negative for blurred vision.   Respiratory: Positive for mild cough, no sputum production, shortness of breath and wheezing.    Cardiovascular: Negative for chest pain, palpitations, orthopnea and leg swelling.   Gastrointestinal: Negative for heartburn, nausea, vomiting and abdominal pain.   Genitourinary: Negative for dysuria, urgency and frequency.     Exam:  /80 (BP Location: Right arm, Patient Position: Sitting, BP Cuff Size: Adult)   Pulse 70   Temp 36.8 °C (98.3 °F) (Temporal)   Resp 16   Wt 84.8 kg (187 lb)   SpO2 99%   General:  Well nourished, well developed female in NAD  Head:Normocephalic, atraumatic  Eyes: PERRLA, EOM within normal limits, no conjunctival injection, no scleral icterus, visual fields and acuity grossly intact.  Ears: Normal shape and symmetry, no tenderness, no discharge. External canals are without any significant edema or erythema. Tympanic membranes are without any inflammation, no effusion. Gross auditory acuity is intact  Nose: Symmetrical without tenderness, no discharge.  Mouth: reasonable hygiene, no erythema exudates or tonsillar enlargement.  Neck: no masses, range of motion within normal limits, no tracheal deviation. No obvious thyroid enlargement.  Pulmonary: chest is symmetrical with respiration, no wheezes, crackles, or rhonchi.  Cardiovascular: regular rate and rhythm without murmurs, rubs, or gallops.  Extremities: no clubbing, cyanosis, or edema.    Please note that this dictation was created using voice recognition software. I have made every reasonable attempt to correct obvious errors, but I expect that there are errors of grammar and possibly content that I did not discover before finalizing the note.    Assessment/Plan:  1. Exposure  to COVID-19 virus  COVID/SARS COV-2 PCR   2. Upper respiratory tract infection, unspecified type  COVID/SARS COV-2 PCR   Discussed strict isolation until Covid test returns, over-the-counter symptomatic relief as needed    Followup with primary care in the next 7-10 days if not significantly improving, return to the urgent care or go to the emergency room sooner for any worsening of symptoms.

## 2021-01-14 ENCOUNTER — OFFICE VISIT (OUTPATIENT)
Dept: MEDICAL GROUP | Facility: PHYSICIAN GROUP | Age: 46
End: 2021-01-14
Payer: COMMERCIAL

## 2021-01-14 VITALS
HEIGHT: 63 IN | OXYGEN SATURATION: 97 % | SYSTOLIC BLOOD PRESSURE: 136 MMHG | DIASTOLIC BLOOD PRESSURE: 84 MMHG | HEART RATE: 72 BPM | TEMPERATURE: 98.1 F | WEIGHT: 190 LBS | BODY MASS INDEX: 33.66 KG/M2

## 2021-01-14 DIAGNOSIS — Z30.41 ENCOUNTER FOR BIRTH CONTROL PILLS MAINTENANCE: ICD-10-CM

## 2021-01-14 DIAGNOSIS — E66.9 OBESITY (BMI 30-39.9): ICD-10-CM

## 2021-01-14 DIAGNOSIS — Z12.31 ENCOUNTER FOR SCREENING MAMMOGRAM FOR BREAST CANCER: ICD-10-CM

## 2021-01-14 DIAGNOSIS — H91.92 DEAFNESS IN LEFT EAR: ICD-10-CM

## 2021-01-14 DIAGNOSIS — H93.11 TINNITUS OF RIGHT EAR: ICD-10-CM

## 2021-01-14 DIAGNOSIS — E55.9 VITAMIN D DEFICIENCY: ICD-10-CM

## 2021-01-14 DIAGNOSIS — G43.829 MENSTRUAL MIGRAINE WITHOUT STATUS MIGRAINOSUS, NOT INTRACTABLE: ICD-10-CM

## 2021-01-14 DIAGNOSIS — Z13.29 SCREENING FOR THYROID DISORDER: ICD-10-CM

## 2021-01-14 DIAGNOSIS — Z13.6 SCREENING FOR CARDIOVASCULAR CONDITION: ICD-10-CM

## 2021-01-14 PROCEDURE — 99214 OFFICE O/P EST MOD 30 MIN: CPT | Performed by: NURSE PRACTITIONER

## 2021-01-14 RX ORDER — SUMATRIPTAN 100 MG/1
100 TABLET, FILM COATED ORAL
Qty: 10 TAB | Refills: 2 | Status: SHIPPED | OUTPATIENT
Start: 2021-01-14

## 2021-01-14 RX ORDER — NORETHINDRONE ACETATE AND ETHINYL ESTRADIOL 1MG-20(21)
1 KIT ORAL DAILY
Qty: 86 TAB | Refills: 3 | Status: SHIPPED | OUTPATIENT
Start: 2021-01-14 | End: 2021-12-07 | Stop reason: SDUPTHER

## 2021-01-14 ASSESSMENT — PATIENT HEALTH QUESTIONNAIRE - PHQ9: CLINICAL INTERPRETATION OF PHQ2 SCORE: 0

## 2021-01-15 NOTE — ASSESSMENT & PLAN NOTE
Patient reports 1 year onset of ringing in right ear.  Worse with tilting her head back.  Has noticed some hearing changes also.  Patient has been deaf in left ear since childhood.

## 2021-01-15 NOTE — ASSESSMENT & PLAN NOTE
Patient is weaning to get into an exercise routine.  Reports she has weights and treadmill at home.  Does better with exercising after work rather than before work.

## 2021-01-15 NOTE — PROGRESS NOTES
CC: Medication refills, ringing in right ear    HISTORY OF THE PRESENT ILLNESS: Patient is a 45 y.o. female. This pleasant patient is here today for evaluation and management of the following health problems.    Health Maintenance: Due      Obesity (BMI 30-39.9)  Patient is weaning to get into an exercise routine.  Reports she has weights and treadmill at home.  Does better with exercising after work rather than before work.    Menstrual migraine without status migrainosus, not intractable  This is a chronic health problem that is well controlled with current medications and lifestyle measures.  Migraines occur about every couple months.  Last migraine was 2 months ago.  Usually relieved with sumatriptan.  Her birth control pills Janel FE 1/20 have significantly helped with reducing frequency of migraines.  Requesting refill today.    Encounter for birth control pills maintenance  Patient requesting refill of birth control pills today.  Denies adverse effects.  Does not smoke.    Tinnitus of right ear  Patient reports 1 year onset of ringing in right ear.  Worse with tilting her head back.  Has noticed some hearing changes also.  Patient has been deaf in left ear since childhood.      Allergies: Codeine    Current Outpatient Medications Ordered in Epic   Medication Sig Dispense Refill   • norethindrone-ethinyl estradiol (JUNEL FE 1/20) 1-20 MG-MCG per tablet Take 1 Tab by mouth every day. 86 Tab 3   • sumatriptan (IMITREX) 100 MG tablet Take 1 Tab by mouth one time as needed for Migraine for up to 1 dose. 10 Tab 2     No current Epic-ordered facility-administered medications on file.        Past Medical History:   Diagnosis Date   • Back pain        Past Surgical History:   Procedure Laterality Date   • ABDOMINAL EXPLORATION      galbladder       Social History     Tobacco Use   • Smoking status: Never Smoker   • Smokeless tobacco: Never Used   Substance Use Topics   • Alcohol use: Yes   • Drug use: No       Family  "History   Problem Relation Age of Onset   • Diabetes Mother    • Heart Disease Mother    • Arthritis Mother    • Diabetes Father    • Heart Disease Father    • Heart Disease Brother        ROS:   As in HPI, otherwise negative for chest pain, dyspnea, abdominal pain, dysuria, blood in stool, fever           Exam: /84 (BP Location: Left arm, Patient Position: Sitting, BP Cuff Size: Large adult)   Pulse 72   Temp 36.7 °C (98.1 °F) (Temporal)   Ht 1.6 m (5' 3\")   Wt 86.2 kg (190 lb)   SpO2 97%  Body mass index is 33.66 kg/m².    General: Alert, pleasant, obese habitus, well nourished, well developed female in NAD  HEENT: Normocephalic. Eyes conjunctiva clear lids without ptosis, pupils equal and reactive to light, ears normal shape and contour, canals are clear bilaterally, tympanic membranes are pearly gray with good light reflex, nasal mucosa without erythema and drainage, oropharynx is without erythema, edema or exudates.   Neck: Supple without bruit. Thyroid is not enlarged.  Pulmonary: Clear to ausculation.  Normal effort. No rales, ronchi, or wheezing.  Cardiovascular: Normal rate and rhythm without murmur. Carotid and radial pulses are intact and equal bilaterally.  No lower extremity edema.  Abdomen: Soft, nontender, nondistended. Normal bowel sounds. Liver and spleen are not palpable  Neurologic: Grossly nonfocal  Lymph: No cervical or supraclavicular lymph nodes are palpable  Skin: Warm and dry.    Musculoskeletal: Normal gait.   Psych: Normal mood and affect. Alert and oriented. Judgment and insight is normal.    Please note that this dictation was created using voice recognition software. I have made every reasonable attempt to correct obvious errors, but I expect that there are errors of grammar and possibly content that I did not discover before finalizing the note.      Assessment/Plan  1. Menstrual migraine without status migrainosus, not intractable  Migraines well controlled, occurring every " couple weeks.  Continue with Imitrex as needed.  Refill sent to pharmacy.  - sumatriptan (IMITREX) 100 MG tablet; Take 1 Tab by mouth one time as needed for Migraine for up to 1 dose.  Dispense: 10 Tab; Refill: 2    2. Screening for cardiovascular condition  We will notify patient of lab results.  - Comp Metabolic Panel; Future  - ESTIMATED GFR; Future  - Lipid Profile; Future    3. Screening for thyroid disorder  Notify patient of lab results  - TSH WITH REFLEX TO FT4; Future    4. Vitamin D deficiency  Taking supplemental vitamin D, recently restarted.  Will get updated vitamin D level.  - VITAMIN D,25 HYDROXY; Future    5. Encounter for birth control pills maintenance  No adverse effects.  Requesting refill.  Prescription sent to pharmacy.  - norethindrone-ethinyl estradiol (JUNEL FE 1/20) 1-20 MG-MCG per tablet; Take 1 Tab by mouth every day.  Dispense: 86 Tab; Refill: 3    6. Encounter for screening mammogram for breast cancer  Ordered.  - MA-SCREENING MAMMO BILAT W/TOMOSYNTHESIS W/CAD; Future    7. Obesity (BMI 30-39.9)  Reviewed ways for patient to incorporate exercise into her day.  Reviewed importance of exercise and weight loss in heart health and diabetes prevention.  - Patient identified as having weight management issue.  Appropriate orders and counseling given.    8. Deafness in left ear    - REFERRAL TO ENT    9. Tinnitus of right ear  Tympanic membranes unremarkable.  We will refer to ENT for further evaluation and management.  - REFERRAL TO ENT    Return to clinic annually or sooner if needed and pending lab results

## 2021-01-15 NOTE — ASSESSMENT & PLAN NOTE
This is a chronic health problem that is well controlled with current medications and lifestyle measures.  Migraines occur about every couple months.  Last migraine was 2 months ago.  Usually relieved with sumatriptan.  Her birth control pills Inez FE 1/20 have significantly helped with reducing frequency of migraines.  Requesting refill today.

## 2021-12-07 DIAGNOSIS — Z30.41 ENCOUNTER FOR BIRTH CONTROL PILLS MAINTENANCE: ICD-10-CM

## 2021-12-09 RX ORDER — NORETHINDRONE ACETATE AND ETHINYL ESTRADIOL 1MG-20(21)
1 KIT ORAL DAILY
Qty: 86 TABLET | Refills: 3 | Status: SHIPPED | OUTPATIENT
Start: 2021-12-09

## 2022-11-30 ENCOUNTER — HOSPITAL ENCOUNTER (OUTPATIENT)
Facility: MEDICAL CENTER | Age: 47
End: 2022-11-30
Attending: OPHTHALMOLOGY | Admitting: OPHTHALMOLOGY
Payer: COMMERCIAL

## 2023-08-24 ENCOUNTER — PATIENT MESSAGE (OUTPATIENT)
Dept: HEALTH INFORMATION MANAGEMENT | Facility: OTHER | Age: 48
End: 2023-08-24

## 2024-06-05 ENCOUNTER — ANESTHESIA EVENT (OUTPATIENT)
Dept: SURGERY | Facility: MEDICAL CENTER | Age: 49
End: 2024-06-05
Payer: COMMERCIAL

## 2024-06-06 ENCOUNTER — HOSPITAL ENCOUNTER (OUTPATIENT)
Facility: MEDICAL CENTER | Age: 49
End: 2024-06-06
Attending: OPHTHALMOLOGY | Admitting: OPHTHALMOLOGY
Payer: COMMERCIAL

## 2024-06-06 ENCOUNTER — ANESTHESIA (OUTPATIENT)
Dept: SURGERY | Facility: MEDICAL CENTER | Age: 49
End: 2024-06-06
Payer: COMMERCIAL

## 2024-06-06 VITALS
OXYGEN SATURATION: 98 % | SYSTOLIC BLOOD PRESSURE: 151 MMHG | RESPIRATION RATE: 14 BRPM | BODY MASS INDEX: 38.28 KG/M2 | DIASTOLIC BLOOD PRESSURE: 84 MMHG | WEIGHT: 216.05 LBS | HEIGHT: 63 IN | HEART RATE: 63 BPM | TEMPERATURE: 97 F

## 2024-06-06 LAB — HCG UR QL: NEGATIVE

## 2024-06-06 PROCEDURE — 160048 HCHG OR STATISTICAL LEVEL 1-5: Performed by: OPHTHALMOLOGY

## 2024-06-06 PROCEDURE — 160035 HCHG PACU - 1ST 60 MINS PHASE I: Performed by: OPHTHALMOLOGY

## 2024-06-06 PROCEDURE — 160025 RECOVERY II MINUTES (STATS): Performed by: OPHTHALMOLOGY

## 2024-06-06 PROCEDURE — 700101 HCHG RX REV CODE 250

## 2024-06-06 PROCEDURE — 700101 HCHG RX REV CODE 250: Performed by: ANESTHESIOLOGY

## 2024-06-06 PROCEDURE — 160046 HCHG PACU - 1ST 60 MINS PHASE II: Performed by: OPHTHALMOLOGY

## 2024-06-06 PROCEDURE — 160041 HCHG SURGERY MINUTES - EA ADDL 1 MIN LEVEL 4: Performed by: OPHTHALMOLOGY

## 2024-06-06 PROCEDURE — 81025 URINE PREGNANCY TEST: CPT

## 2024-06-06 PROCEDURE — A9270 NON-COVERED ITEM OR SERVICE: HCPCS | Performed by: ANESTHESIOLOGY

## 2024-06-06 PROCEDURE — 160002 HCHG RECOVERY MINUTES (STAT): Performed by: OPHTHALMOLOGY

## 2024-06-06 PROCEDURE — 700101 HCHG RX REV CODE 250: Performed by: OPHTHALMOLOGY

## 2024-06-06 PROCEDURE — 700111 HCHG RX REV CODE 636 W/ 250 OVERRIDE (IP): Performed by: OPHTHALMOLOGY

## 2024-06-06 PROCEDURE — 700102 HCHG RX REV CODE 250 W/ 637 OVERRIDE(OP): Performed by: ANESTHESIOLOGY

## 2024-06-06 PROCEDURE — 160009 HCHG ANES TIME/MIN: Performed by: OPHTHALMOLOGY

## 2024-06-06 PROCEDURE — 160029 HCHG SURGERY MINUTES - 1ST 30 MINS LEVEL 4: Performed by: OPHTHALMOLOGY

## 2024-06-06 PROCEDURE — 700105 HCHG RX REV CODE 258: Performed by: OPHTHALMOLOGY

## 2024-06-06 PROCEDURE — 700111 HCHG RX REV CODE 636 W/ 250 OVERRIDE (IP): Performed by: ANESTHESIOLOGY

## 2024-06-06 DEVICE — SILICONE OIL 10ML: Type: IMPLANTABLE DEVICE | Site: EYE | Status: FUNCTIONAL

## 2024-06-06 RX ORDER — FLURBIPROFEN SODIUM 0.3 MG/ML
SOLUTION/ DROPS OPHTHALMIC
Status: COMPLETED
Start: 2024-06-06 | End: 2024-06-06

## 2024-06-06 RX ORDER — CYCLOPENTOLATE HYDROCHLORIDE 10 MG/ML
SOLUTION/ DROPS OPHTHALMIC
Status: COMPLETED
Start: 2024-06-06 | End: 2024-06-06

## 2024-06-06 RX ORDER — HYDROMORPHONE HYDROCHLORIDE 1 MG/ML
0.4 INJECTION, SOLUTION INTRAMUSCULAR; INTRAVENOUS; SUBCUTANEOUS
Status: DISCONTINUED | OUTPATIENT
Start: 2024-06-06 | End: 2024-06-06 | Stop reason: HOSPADM

## 2024-06-06 RX ORDER — ONDANSETRON 2 MG/ML
4 INJECTION INTRAMUSCULAR; INTRAVENOUS
Status: DISCONTINUED | OUTPATIENT
Start: 2024-06-06 | End: 2024-06-06 | Stop reason: HOSPADM

## 2024-06-06 RX ORDER — EPHEDRINE SULFATE 50 MG/ML
INJECTION, SOLUTION INTRAVENOUS PRN
Status: DISCONTINUED | OUTPATIENT
Start: 2024-06-06 | End: 2024-06-06 | Stop reason: SURG

## 2024-06-06 RX ORDER — LIDOCAINE HYDROCHLORIDE 20 MG/ML
INJECTION, SOLUTION EPIDURAL; INFILTRATION; INTRACAUDAL; PERINEURAL PRN
Status: DISCONTINUED | OUTPATIENT
Start: 2024-06-06 | End: 2024-06-06 | Stop reason: SURG

## 2024-06-06 RX ORDER — NEOMYCIN SULFATE, POLYMYXIN B SULFATE, AND DEXAMETHASONE 3.5; 10000; 1 MG/G; [USP'U]/G; MG/G
OINTMENT OPHTHALMIC
Status: DISCONTINUED | OUTPATIENT
Start: 2024-06-06 | End: 2024-06-06 | Stop reason: HOSPADM

## 2024-06-06 RX ORDER — DEXAMETHASONE SODIUM PHOSPHATE 4 MG/ML
INJECTION, SOLUTION INTRA-ARTICULAR; INTRALESIONAL; INTRAMUSCULAR; INTRAVENOUS; SOFT TISSUE PRN
Status: DISCONTINUED | OUTPATIENT
Start: 2024-06-06 | End: 2024-06-06 | Stop reason: SURG

## 2024-06-06 RX ORDER — ATROPINE SULFATE 10 MG/ML
SOLUTION/ DROPS OPHTHALMIC
Status: DISCONTINUED | OUTPATIENT
Start: 2024-06-06 | End: 2024-06-06 | Stop reason: HOSPADM

## 2024-06-06 RX ORDER — SODIUM CHLORIDE, SODIUM LACTATE, POTASSIUM CHLORIDE, CALCIUM CHLORIDE 600; 310; 30; 20 MG/100ML; MG/100ML; MG/100ML; MG/100ML
INJECTION, SOLUTION INTRAVENOUS CONTINUOUS
Status: DISCONTINUED | OUTPATIENT
Start: 2024-06-06 | End: 2024-06-06 | Stop reason: HOSPADM

## 2024-06-06 RX ORDER — BALANCED SALT SOLUTION ENRICHED WITH BICARBONATE, DEXTROSE, AND GLUTATHIONE
KIT INTRAOCULAR
Status: DISCONTINUED
Start: 2024-06-06 | End: 2024-06-06 | Stop reason: HOSPADM

## 2024-06-06 RX ORDER — BALANCED SALT SOLUTION 6.4; .75; .48; .3; 3.9; 1.7 MG/ML; MG/ML; MG/ML; MG/ML; MG/ML; MG/ML
SOLUTION OPHTHALMIC
Status: DISCONTINUED | OUTPATIENT
Start: 2024-06-06 | End: 2024-06-06 | Stop reason: HOSPADM

## 2024-06-06 RX ORDER — EPHEDRINE SULFATE 50 MG/ML
5 INJECTION, SOLUTION INTRAVENOUS
Status: DISCONTINUED | OUTPATIENT
Start: 2024-06-06 | End: 2024-06-06 | Stop reason: HOSPADM

## 2024-06-06 RX ORDER — CEFAZOLIN SODIUM 1 G/3ML
INJECTION, POWDER, FOR SOLUTION INTRAMUSCULAR; INTRAVENOUS
Status: DISCONTINUED | OUTPATIENT
Start: 2024-06-06 | End: 2024-06-06 | Stop reason: HOSPADM

## 2024-06-06 RX ORDER — HYDROMORPHONE HYDROCHLORIDE 1 MG/ML
0.2 INJECTION, SOLUTION INTRAMUSCULAR; INTRAVENOUS; SUBCUTANEOUS
Status: DISCONTINUED | OUTPATIENT
Start: 2024-06-06 | End: 2024-06-06 | Stop reason: HOSPADM

## 2024-06-06 RX ORDER — OXYCODONE HCL 5 MG/5 ML
10 SOLUTION, ORAL ORAL
Status: DISCONTINUED | OUTPATIENT
Start: 2024-06-06 | End: 2024-06-06 | Stop reason: HOSPADM

## 2024-06-06 RX ORDER — HALOPERIDOL 5 MG/ML
1 INJECTION INTRAMUSCULAR
Status: DISCONTINUED | OUTPATIENT
Start: 2024-06-06 | End: 2024-06-06 | Stop reason: HOSPADM

## 2024-06-06 RX ORDER — BALANCED SALT SOLUTION ENRICHED WITH BICARBONATE, DEXTROSE, AND GLUTATHIONE
KIT INTRAOCULAR
Status: DISCONTINUED | OUTPATIENT
Start: 2024-06-06 | End: 2024-06-06 | Stop reason: HOSPADM

## 2024-06-06 RX ORDER — LABETALOL HYDROCHLORIDE 5 MG/ML
5 INJECTION, SOLUTION INTRAVENOUS
Status: DISCONTINUED | OUTPATIENT
Start: 2024-06-06 | End: 2024-06-06 | Stop reason: HOSPADM

## 2024-06-06 RX ORDER — DIPHENHYDRAMINE HYDROCHLORIDE 50 MG/ML
12.5 INJECTION INTRAMUSCULAR; INTRAVENOUS
Status: DISCONTINUED | OUTPATIENT
Start: 2024-06-06 | End: 2024-06-06 | Stop reason: HOSPADM

## 2024-06-06 RX ORDER — HYDRALAZINE HYDROCHLORIDE 20 MG/ML
5 INJECTION INTRAMUSCULAR; INTRAVENOUS
Status: DISCONTINUED | OUTPATIENT
Start: 2024-06-06 | End: 2024-06-06 | Stop reason: HOSPADM

## 2024-06-06 RX ORDER — DEXAMETHASONE SODIUM PHOSPHATE 4 MG/ML
INJECTION, SOLUTION INTRA-ARTICULAR; INTRALESIONAL; INTRAMUSCULAR; INTRAVENOUS; SOFT TISSUE
Status: DISCONTINUED | OUTPATIENT
Start: 2024-06-06 | End: 2024-06-06 | Stop reason: HOSPADM

## 2024-06-06 RX ORDER — TIMOLOL MALEATE 5 MG/ML
SOLUTION/ DROPS OPHTHALMIC
Status: DISCONTINUED | OUTPATIENT
Start: 2024-06-06 | End: 2024-06-06 | Stop reason: HOSPADM

## 2024-06-06 RX ORDER — ONDANSETRON 2 MG/ML
INJECTION INTRAMUSCULAR; INTRAVENOUS PRN
Status: DISCONTINUED | OUTPATIENT
Start: 2024-06-06 | End: 2024-06-06 | Stop reason: SURG

## 2024-06-06 RX ORDER — PHENYLEPHRINE HYDROCHLORIDE 25 MG/ML
SOLUTION/ DROPS OPHTHALMIC
Status: COMPLETED
Start: 2024-06-06 | End: 2024-06-06

## 2024-06-06 RX ORDER — MOXIFLOXACIN 5 MG/ML
SOLUTION/ DROPS OPHTHALMIC
Status: COMPLETED
Start: 2024-06-06 | End: 2024-06-06

## 2024-06-06 RX ORDER — MIDAZOLAM HYDROCHLORIDE 1 MG/ML
INJECTION INTRAMUSCULAR; INTRAVENOUS PRN
Status: DISCONTINUED | OUTPATIENT
Start: 2024-06-06 | End: 2024-06-06 | Stop reason: SURG

## 2024-06-06 RX ORDER — OXYCODONE HCL 5 MG/5 ML
5 SOLUTION, ORAL ORAL
Status: DISCONTINUED | OUTPATIENT
Start: 2024-06-06 | End: 2024-06-06 | Stop reason: HOSPADM

## 2024-06-06 RX ORDER — MEPERIDINE HYDROCHLORIDE 25 MG/ML
6.25 INJECTION INTRAMUSCULAR; INTRAVENOUS; SUBCUTANEOUS
Status: DISCONTINUED | OUTPATIENT
Start: 2024-06-06 | End: 2024-06-06 | Stop reason: HOSPADM

## 2024-06-06 RX ORDER — HYDROMORPHONE HYDROCHLORIDE 1 MG/ML
0.1 INJECTION, SOLUTION INTRAMUSCULAR; INTRAVENOUS; SUBCUTANEOUS
Status: DISCONTINUED | OUTPATIENT
Start: 2024-06-06 | End: 2024-06-06 | Stop reason: HOSPADM

## 2024-06-06 RX ORDER — ACETAMINOPHEN 500 MG
1000 TABLET ORAL ONCE
Status: COMPLETED | OUTPATIENT
Start: 2024-06-06 | End: 2024-06-06

## 2024-06-06 RX ORDER — TROPICAMIDE 10 MG/ML
SOLUTION/ DROPS OPHTHALMIC
Status: COMPLETED
Start: 2024-06-06 | End: 2024-06-06

## 2024-06-06 RX ADMIN — FENTANYL CITRATE 25 MCG: 50 INJECTION, SOLUTION INTRAMUSCULAR; INTRAVENOUS at 09:55

## 2024-06-06 RX ADMIN — FENTANYL CITRATE 50 MCG: 50 INJECTION, SOLUTION INTRAMUSCULAR; INTRAVENOUS at 09:20

## 2024-06-06 RX ADMIN — EPHEDRINE SULFATE 5 MG: 50 INJECTION, SOLUTION INTRAVENOUS at 09:47

## 2024-06-06 RX ADMIN — LIDOCAINE HYDROCHLORIDE 100 MG: 20 INJECTION, SOLUTION EPIDURAL; INFILTRATION; INTRACAUDAL at 09:21

## 2024-06-06 RX ADMIN — FENTANYL CITRATE 25 MCG: 50 INJECTION, SOLUTION INTRAMUSCULAR; INTRAVENOUS at 09:51

## 2024-06-06 RX ADMIN — EPHEDRINE SULFATE 5 MG: 50 INJECTION, SOLUTION INTRAVENOUS at 09:37

## 2024-06-06 RX ADMIN — SODIUM CHLORIDE, POTASSIUM CHLORIDE, SODIUM LACTATE AND CALCIUM CHLORIDE: 600; 310; 30; 20 INJECTION, SOLUTION INTRAVENOUS at 08:37

## 2024-06-06 RX ADMIN — FENTANYL CITRATE 25 MCG: 50 INJECTION, SOLUTION INTRAMUSCULAR; INTRAVENOUS at 09:34

## 2024-06-06 RX ADMIN — PHENYLEPHRINE HYDROCHLORIDE: 25 SOLUTION/ DROPS OPHTHALMIC at 08:38

## 2024-06-06 RX ADMIN — TROPICAMIDE 1 DROP: 10 SOLUTION/ DROPS OPHTHALMIC at 08:38

## 2024-06-06 RX ADMIN — CYCLOPENTOLATE HYDROCHLORIDE 1 DROP: 10 SOLUTION OPHTHALMIC at 08:38

## 2024-06-06 RX ADMIN — MIDAZOLAM HYDROCHLORIDE 1 MG: 1 INJECTION, SOLUTION INTRAMUSCULAR; INTRAVENOUS at 09:20

## 2024-06-06 RX ADMIN — PROPOFOL 240 MG: 10 INJECTION, EMULSION INTRAVENOUS at 09:21

## 2024-06-06 RX ADMIN — FENTANYL CITRATE 25 MCG: 50 INJECTION, SOLUTION INTRAMUSCULAR; INTRAVENOUS at 09:30

## 2024-06-06 RX ADMIN — ONDANSETRON 4 MG: 2 INJECTION INTRAMUSCULAR; INTRAVENOUS at 09:55

## 2024-06-06 RX ADMIN — TROPICAMIDE 1 DROP: 10 SOLUTION/ DROPS OPHTHALMIC at 08:33

## 2024-06-06 RX ADMIN — MOXIFLOXACIN: 5 SOLUTION/ DROPS OPHTHALMIC at 08:33

## 2024-06-06 RX ADMIN — EPHEDRINE SULFATE 5 MG: 50 INJECTION, SOLUTION INTRAVENOUS at 09:26

## 2024-06-06 RX ADMIN — FLURBIPROFEN SODIUM: 0.3 SOLUTION/ DROPS OPHTHALMIC at 08:38

## 2024-06-06 RX ADMIN — PHENYLEPHRINE HYDROCHLORIDE: 25 SOLUTION/ DROPS OPHTHALMIC at 08:33

## 2024-06-06 RX ADMIN — FENTANYL CITRATE 25 MCG: 50 INJECTION, SOLUTION INTRAMUSCULAR; INTRAVENOUS at 09:27

## 2024-06-06 RX ADMIN — FENTANYL CITRATE 25 MCG: 50 INJECTION, SOLUTION INTRAMUSCULAR; INTRAVENOUS at 09:24

## 2024-06-06 RX ADMIN — CYCLOPENTOLATE HYDROCHLORIDE 1 DROP: 10 SOLUTION OPHTHALMIC at 08:33

## 2024-06-06 RX ADMIN — ACETAMINOPHEN 1000 MG: 500 TABLET, FILM COATED ORAL at 08:07

## 2024-06-06 RX ADMIN — DEXAMETHASONE SODIUM PHOSPHATE 4 MG: 4 INJECTION INTRA-ARTICULAR; INTRALESIONAL; INTRAMUSCULAR; INTRAVENOUS; SOFT TISSUE at 09:25

## 2024-06-06 RX ADMIN — FLURBIPROFEN SODIUM: 0.3 SOLUTION/ DROPS OPHTHALMIC at 08:33

## 2024-06-06 RX ADMIN — MOXIFLOXACIN: 5 SOLUTION/ DROPS OPHTHALMIC at 08:38

## 2024-06-06 NOTE — ANESTHESIA TIME REPORT
Anesthesia Start and Stop Event Times       Date Time Event    6/6/2024 0904 Ready for Procedure     0911 Anesthesia Start     1015 Anesthesia Stop          Responsible Staff  06/06/24      Name Role Begin End    Kiera Juarez M.D. Anesth 0911 1015          Overtime Reason:  no overtime (within assigned shift)    Comments:

## 2024-06-06 NOTE — ANESTHESIA PREPROCEDURE EVALUATION
Case: [id number] Date/Time: 06/06/24 0845    Procedure: LEFT EYE VITRECTOMY WITH MEMBRANE DISSECTION, LASER, INTRAOCULAR GAS, INTRAOCULAR SILICONE OIL    Pre-op diagnosis: RETINAL DETACHMENT    Location: [facility] ROOM 24 / SURGERY SAME DAY [facility]    Surgeons: Dona Kenyon M.D.            Relevant Problems   ANESTHESIA (within normal limits)      PULMONARY (within normal limits)      NEURO   (positive) Menstrual migraine without status migrainosus, not intractable   (negative) CVA (cerebral vascular accident) ([facility])   (negative) Neuromuscular disease (HCC)   (negative) TIA (transient ischemic attack)      CARDIAC   (positive) Menstrual migraine without status migrainosus, not intractable   (negative) CAD (coronary artery disease)   (negative) MI (myocardial infarction) (HCC)      ENDO (within normal limits)       Physical Exam    Airway   Mallampati: II  TM distance: >3 FB  Neck ROM: full       Cardiovascular - normal exam  Rhythm: regular  Rate: normal  (-) murmur     Dental - normal exam           Pulmonary - normal exam  Breath sounds clear to auscultation     Abdominal    Neurological - normal exam                   Anesthesia Plan    ASA 2       Plan - general       Airway plan will be LMA          Induction: intravenous    Postoperative Plan: Postoperative administration of opioids is intended.    Pertinent diagnostic labs and testing reviewed    Informed Consent:    Anesthetic plan and risks discussed with patient.    Use of blood products discussed with: patient whom consented to blood products.

## 2024-06-06 NOTE — DISCHARGE INSTRUCTIONS
HOME CARE INSTRUCTIONS    ACTIVITY: Rest and take it easy for the first 24 hours.  A responsible adult is recommended to remain with you during that time.  It is normal to feel sleepy.  We encourage you to not do anything that requires balance, judgment or coordination.    FOR 24 HOURS DO NOT:  Drive, operate machinery or run household appliances.  Drink beer or alcoholic beverages.  Make important decisions or sign legal documents.    SPECIAL INSTRUCTIONS: See Handout    DIET: To avoid nausea, slowly advance diet as tolerated, avoiding spicy or greasy foods for the first day.  Add more substantial food to your diet according to your physician's instructions.  Babies can be fed formula or breast milk as soon as they are hungry.  INCREASE FLUIDS AND FIBER TO AVOID CONSTIPATION.    MEDICATIONS: Resume taking daily medication.  Take prescribed pain medication with food.  If no medication is prescribed, you may take non-aspirin pain medication if needed.  PAIN MEDICATION CAN BE VERY CONSTIPATING.  Take a stool softener or laxative such as senokot, pericolace, or milk of magnesia if needed.    Last pain medication given: 1,000 mg Tylenol given at 8:00 am. Ok to take next dose of tylenol after 2:00 pm, as needed for pain    A follow-up appointment should be arranged with your doctor; call to schedule.    You should CALL YOUR PHYSICIAN if you develop:  Fever greater than 101 degrees F.  Pain not relieved by medication, or persistent nausea or vomiting.  Excessive bleeding (blood soaking through dressing) or unexpected drainage from the wound.  Extreme redness or swelling around the incision site, drainage of pus or foul smelling drainage.  Inability to urinate or empty your bladder within 8 hours.  Problems with breathing or chest pain.    You should call 911 if you develop problems with breathing or chest pain.  If you are unable to contact your doctor or surgical center, you should go to the nearest emergency room or  urgent care center.    Physician's telephone #: Dr. Kenyon [phone]    MILD FLU-LIKE SYMPTOMS ARE NORMAL.  YOU MAY EXPERIENCE GENERALIZED MUSCLE ACHES, THROAT IRRITATION, HEADACHE AND/OR SOME NAUSEA.    If any questions arise, call your doctor.  If your doctor is not available, please feel free to call the Surgical Center at [phone].  The Center is open Monday through Friday from 7AM to 7PM.      A registered nurse may call you a few days after your surgery to see how you are doing after your procedure.    You may also receive a survey in the mail within the next two weeks and we ask that you take a few moments to complete the survey and return it to us.  Our goal is to provide you with very good care and we value your comments.

## 2024-06-06 NOTE — OP REPORT
SURGEON:  Dona Kenyon MD     ASSISTANT:  None.       PROCEDURES:  A 23-gauge pars plana vitrectomy, ERM and ILM peel, endolaser and oil left eye.  PREOPERATIVE DIAGNOSIS:  Retinal detachment left eye     POSTOPERATIVE DIAGNOSIS:  Same     ANESTHESIA:  General endotracheal.       FLUIDS:  See anesthesia note.       COMPLICATIONS:  None.       DETAILS OF THE PROCEDURE:  Correct eye was marked in the preoperative area.    Patient was taken to the operating room.  General anesthesia induced by anesthesiology and the patient was prepped and draped in the usual sterile ophthalmic fashion.  Site was marked 4 mm from the limbus in the inferotemporal quadrant.  A 23-gauge trocar was used in a beveled fashion to enter the vitreous cavity.  Infusion was placed in the eye, visualized with the light pipe and turned on.  One site superonasally and another site superotemporally were then marked 4 mm from the limbus.  A 23-gauge trocar was used in a beveled fashion to enter the vitreous cavity.  Light pipe and vitrector were inserted inside the eye.  We used 23G forceps to peel ERM and ILM. Air-fluid exchange was then performed and endolaser was applied around the tear. Silicon oil was placed in the eye. Trocars were then removed.  There was no leak. Postop Ancef and dexamethasone were injected subconjunctivally.  Drapes were then removed.  Patient's face was cleaned, ointment applied to the eye.  Eye was patched and shielded. Patient was taken to the recovery room in good condition.  There were no complications.

## 2024-06-06 NOTE — ANESTHESIA PROCEDURE NOTES
Airway    Date/Time: 6/6/2024 9:22 AM    Performed by: Kiera Juarez M.D.  Authorized by: Kiera Juarez M.D.    Location:  OR  Urgency:  Elective  Indications for Airway Management:  Anesthesia      Spontaneous Ventilation: absent    Sedation Level:  Deep  Preoxygenated: Yes    Final Airway Type:  Supraglottic airway  Final Supraglottic Airway:  Flexible LMA    SGA Size:  4  Number of Attempts at Approach:  1

## 2024-06-06 NOTE — OR NURSING
1013 - Pt to PACU 9 from OR.  Bedside report from anesthesiologist and RN.  Attached to monitoring, VSS, breathing is calm and unlabored.  6L mask titrated to room air, denies pain or nausea at this time.  Dressings to left eye are clean / dry / intact.     1053 - Pt stable to discharge. Instructions given, patient and  verbalize understanding.  IV And armbands removed.  Taken via wheelchair to car.  Pt has all belongings with them.

## 2024-06-06 NOTE — ANESTHESIA POSTPROCEDURE EVALUATION
Patient: Jennifer Lamb    Procedure Summary       Date: 06/06/24 Room / Location: [facility] ROOM 24 / SURGERY SAME DAY [facility]    Anesthesia Start: 0911 Anesthesia Stop: 1015    Procedure: LEFT EYE VITRECTOMY WITH MEMBRANE PEEL, LASER, INTRAOCULAR SILICONE OIL (Left: Eye) Diagnosis: (RETINAL DETACHMENT, macular pucker)    Surgeons: Dona Kenyon M.D. Responsible Provider: Kiera Juarez M.D.    Anesthesia Type: general ASA Status: 2            Final Anesthesia Type: general  Last vitals  BP   Blood Pressure: (!) 151/84    Temp   36.1 °C (97 °F)    Pulse   63   Resp   14    SpO2   98 %      Anesthesia Post Evaluation    Patient location during evaluation: PACU  Patient participation: complete - patient participated  Level of consciousness: awake and alert    Airway patency: patent  Anesthetic complications: no  Cardiovascular status: hemodynamically stable  Respiratory status: acceptable  Hydration status: euvolemic    PONV: none          No notable events documented.     Nurse Pain Score: 0 (NPRS)

## (undated) DEVICE — NEEDLE SAFETY 18 GA X 1 1/2 IN (100EA/BX)

## (undated) DEVICE — CANNULA O2 COMFORT SOFT EAR ADULT 7 FT TUBING (50/CA)

## (undated) DEVICE — KNIFE MICROSURGICAL 15 DEGREE GREEN

## (undated) DEVICE — TUBING CLEARLINK DUO-VENT - C-FLO (48EA/CA)

## (undated) DEVICE — LENS GRIESHABER MACULAR

## (undated) DEVICE — WATER IRRIGATION STERILE 1000ML (12EA/CA)

## (undated) DEVICE — BACKFLUSH SOFT TIP 23GA - (6/BX)

## (undated) DEVICE — SUTURE GENERAL

## (undated) DEVICE — GOWN WARMING STANDARD FLEX - (30/CA)

## (undated) DEVICE — SODIUM CHL IRRIGATION 0.9% 1000ML (12EA/CA)

## (undated) DEVICE — MASK OXYGEN VNYL ADLT MED CONC WITH 7 FOOT TUBING  - (50EA/CA)

## (undated) DEVICE — SYRINGE NON SAFETY 10 CC 20 GA X 1-1/2 IN (100/BX 4BX/CA)

## (undated) DEVICE — PROBE 23 GA ILLUM FLEX CURVED - LASER(6/BX)

## (undated) DEVICE — PACK VITRECTOMY (1EA/CA)

## (undated) DEVICE — SHIELD OPTH AL GRTR CVR FOX (50EA/BX)

## (undated) DEVICE — PACK VITRECTOMY 23G 10K BEVELED (1EA/BX)

## (undated) DEVICE — KIT  I.V. START (100EA/CA)

## (undated) DEVICE — SET LEADWIRE 5 LEAD BEDSIDE DISPOSABLE ECG (1SET OF 5/EA)

## (undated) DEVICE — CANNULA STRAIGHT 23G X 1 1/4IN - W/BLUNT (10/BX)

## (undated) DEVICE — ELECTRODE DUAL RETURN W/ CORD - (50/PK)

## (undated) DEVICE — SLEEVE VASO CALF MED - (10PR/CA)

## (undated) DEVICE — CANNULA SUB-TENONS ANESTH. 1.1X25MM 19GAX1IN (10EA/SP)

## (undated) DEVICE — TUBE CONNECTING SUCTION - CLEAR PLASTIC STERILE 72 IN (50EA/CA)

## (undated) DEVICE — TOWEL STOP TIMEOUT SAFETY FLAG (40EA/CA)

## (undated) DEVICE — PACK EYE VFC TUBING CONST - CONSTELLATION (6/BX)

## (undated) DEVICE — CANISTER SUCTION RIGID RED 1500CC (40EA/CA)

## (undated) DEVICE — PAD EYE GAUZE COVERED OVAL 1 5/8 X 2 5/8" STERILE"

## (undated) DEVICE — SUTURE 7-0 VICRYL TG140-8 (12PK/BX)

## (undated) DEVICE — CANNULA DIVIDED ADULT CO2 - SAMPLE W/FEMALE CONNCT (25/CA)

## (undated) DEVICE — MASK AIRWAY FLEXIBLE SINGLE-USE SIZE 4 ADULTS (10EA/BX)

## (undated) DEVICE — CANISTER SUCTION 3000ML MECHANICAL FILTER AUTO SHUTOFF MEDI-VAC NONSTERILE LF DISP  (40EA/CA)

## (undated) DEVICE — FORCEP ILM 23GA DISP REVOLUT. - (6/BX)

## (undated) DEVICE — SYRINGE 30 ML LL (56/BX)

## (undated) DEVICE — SENSOR OXIMETER ADULT SPO2 RD SET (20EA/BX)

## (undated) DEVICE — LACTATED RINGERS INJ 1000 ML - (14EA/CA 60CA/PF)

## (undated) DEVICE — GLOVE BIOGEL SZ 7.5 SURGICAL PF LTX - (50PR/BX 4BX/CA)

## (undated) DEVICE — SUCTION INSTRUMENT YANKAUER BULBOUS TIP W/O VENT (50EA/CA)

## (undated) DEVICE — SYRINGE SAFETY TB 1 ML 27 GA X 1/2 IN (100/BX 4BX/CA)

## (undated) DEVICE — NEEDLE FILTER ASPIRATION 18 GA X 1 1/2 IN (100EA/BX)